# Patient Record
Sex: MALE | Race: WHITE | NOT HISPANIC OR LATINO | Employment: FULL TIME | URBAN - METROPOLITAN AREA
[De-identification: names, ages, dates, MRNs, and addresses within clinical notes are randomized per-mention and may not be internally consistent; named-entity substitution may affect disease eponyms.]

---

## 2019-03-08 ENCOUNTER — TRANSCRIBE ORDERS (OUTPATIENT)
Dept: ADMINISTRATIVE | Facility: HOSPITAL | Age: 49
End: 2019-03-08

## 2019-03-08 DIAGNOSIS — M77.12 LATERAL EPICONDYLITIS OF LEFT ELBOW: Primary | ICD-10-CM

## 2019-03-13 ENCOUNTER — HOSPITAL ENCOUNTER (OUTPATIENT)
Dept: RADIOLOGY | Facility: IMAGING CENTER | Age: 49
Discharge: HOME | End: 2019-03-13
Payer: COMMERCIAL

## 2019-03-13 DIAGNOSIS — M77.12 LATERAL EPICONDYLITIS OF LEFT ELBOW: ICD-10-CM

## 2019-03-13 PROCEDURE — 73221 MRI JOINT UPR EXTREM W/O DYE: CPT

## 2020-10-29 ENCOUNTER — OFFICE VISIT (OUTPATIENT)
Dept: PULMONOLOGY | Facility: CLINIC | Age: 50
End: 2020-10-29
Payer: COMMERCIAL

## 2020-10-29 VITALS
HEIGHT: 70 IN | HEART RATE: 88 BPM | SYSTOLIC BLOOD PRESSURE: 120 MMHG | DIASTOLIC BLOOD PRESSURE: 72 MMHG | WEIGHT: 184.13 LBS | OXYGEN SATURATION: 97 % | BODY MASS INDEX: 26.36 KG/M2 | TEMPERATURE: 96.9 F

## 2020-10-29 DIAGNOSIS — G47.33 OBSTRUCTIVE SLEEP APNEA: Primary | ICD-10-CM

## 2020-10-29 PROBLEM — F32.A DEPRESSION: Status: ACTIVE | Noted: 2020-10-29

## 2020-10-29 PROCEDURE — 99213 OFFICE O/P EST LOW 20 MIN: CPT | Performed by: INTERNAL MEDICINE

## 2020-10-29 RX ORDER — BUPROPION HYDROCHLORIDE 100 MG/1
TABLET, EXTENDED RELEASE ORAL
COMMUNITY
Start: 2020-08-17

## 2020-10-29 RX ORDER — ESCITALOPRAM OXALATE 10 MG/1
15 TABLET ORAL DAILY
COMMUNITY
Start: 2020-10-14 | End: 2021-01-14

## 2020-11-11 ENCOUNTER — TELEPHONE (OUTPATIENT)
Dept: PULMONOLOGY | Facility: CLINIC | Age: 50
End: 2020-11-11

## 2020-11-11 DIAGNOSIS — G47.33 OBSTRUCTIVE SLEEP APNEA: Primary | ICD-10-CM

## 2020-11-11 RX ORDER — ZOLPIDEM TARTRATE 5 MG/1
TABLET ORAL
Qty: 60 TABLET | Refills: 0 | Status: SHIPPED | OUTPATIENT
Start: 2020-11-11 | End: 2020-12-03 | Stop reason: DRUGHIGH

## 2020-12-03 ENCOUNTER — OFFICE VISIT (OUTPATIENT)
Dept: PULMONOLOGY | Facility: CLINIC | Age: 50
End: 2020-12-03
Payer: COMMERCIAL

## 2020-12-03 VITALS
BODY MASS INDEX: 25.95 KG/M2 | TEMPERATURE: 97.6 F | WEIGHT: 181.25 LBS | HEART RATE: 89 BPM | SYSTOLIC BLOOD PRESSURE: 120 MMHG | DIASTOLIC BLOOD PRESSURE: 78 MMHG | OXYGEN SATURATION: 96 % | HEIGHT: 70 IN

## 2020-12-03 DIAGNOSIS — G47.33 OBSTRUCTIVE SLEEP APNEA: Primary | ICD-10-CM

## 2020-12-03 PROCEDURE — 99213 OFFICE O/P EST LOW 20 MIN: CPT | Performed by: INTERNAL MEDICINE

## 2020-12-03 RX ORDER — FLUTICASONE PROPIONATE 50 MCG
2 SPRAY, SUSPENSION (ML) NASAL DAILY
Qty: 1 BOTTLE | Refills: 5 | Status: SHIPPED | OUTPATIENT
Start: 2020-12-03 | End: 2021-05-24

## 2020-12-03 RX ORDER — ZOLPIDEM TARTRATE 12.5 MG/1
12.5 TABLET, FILM COATED, EXTENDED RELEASE ORAL
COMMUNITY
Start: 2020-11-25 | End: 2020-12-03 | Stop reason: ALTCHOICE

## 2020-12-03 RX ORDER — ZALEPLON 10 MG/1
10 CAPSULE ORAL
Qty: 30 CAPSULE | Refills: 1 | Status: SHIPPED | OUTPATIENT
Start: 2020-12-03 | End: 2021-01-14 | Stop reason: SDUPTHER

## 2021-01-14 ENCOUNTER — OFFICE VISIT (OUTPATIENT)
Dept: PULMONOLOGY | Facility: CLINIC | Age: 51
End: 2021-01-14
Payer: COMMERCIAL

## 2021-01-14 VITALS
BODY MASS INDEX: 26.68 KG/M2 | OXYGEN SATURATION: 98 % | HEART RATE: 87 BPM | SYSTOLIC BLOOD PRESSURE: 114 MMHG | DIASTOLIC BLOOD PRESSURE: 78 MMHG | WEIGHT: 186.38 LBS | HEIGHT: 70 IN | TEMPERATURE: 97.6 F

## 2021-01-14 DIAGNOSIS — G47.33 OBSTRUCTIVE SLEEP APNEA: ICD-10-CM

## 2021-01-14 PROCEDURE — 99212 OFFICE O/P EST SF 10 MIN: CPT | Performed by: INTERNAL MEDICINE

## 2021-01-14 RX ORDER — ESCITALOPRAM OXALATE 20 MG/1
20 TABLET ORAL DAILY
COMMUNITY
Start: 2020-12-09

## 2021-01-14 RX ORDER — ZOLPIDEM TARTRATE 10 MG/1
10 TABLET ORAL
COMMUNITY
Start: 2021-01-06 | End: 2021-01-14 | Stop reason: ALTCHOICE

## 2021-01-14 RX ORDER — ZALEPLON 10 MG/1
10 CAPSULE ORAL
Qty: 30 CAPSULE | Refills: 1 | Status: SHIPPED | OUTPATIENT
Start: 2021-01-14

## 2021-01-14 NOTE — ASSESSMENT & PLAN NOTE
This patient still struggles to use effective CPAP because of nasal obstruction  He has nasal surgery scheduled on March 1st   He does sense benefit when he uses the CPAP but he cannot use it all night because of nasal obstruction  Plan to meet again after he has a nasal surgery and is able to try using CPAP again    Renew his zaleplon which she uses to help fall asleep with the CPAP on   10 minutes visit with counseling

## 2021-01-14 NOTE — PROGRESS NOTES
Assessment/Plan:    Obstructive sleep apnea  This patient still struggles to use effective CPAP because of nasal obstruction  He has nasal surgery scheduled on March 1st   He does sense benefit when he uses the CPAP but he cannot use it all night because of nasal obstruction  Plan to meet again after he has a nasal surgery and is able to try using CPAP again  Renew his zaleplon which she uses to help fall asleep with the CPAP on   10 minutes visit with counseling       Diagnoses and all orders for this visit:    Obstructive sleep apnea  -     zaleplon (SONATA) 10 MG capsule; Take 1 capsule (10 mg total) by mouth daily at bedtime    Other orders  -     Discontinue: zolpidem (AMBIEN) 10 mg tablet; Take 10 mg by mouth daily at bedtime  -     escitalopram (LEXAPRO) 20 mg tablet; Take 20 mg by mouth daily          Subjective:      Patient ID: Yanni Bower is a 45 y o  male  This patient still struggles to use CPAP  Problem is that he awakens in the middle of the night with the CPAP device off because his nose is completely obstructed  He does sense some minor benefit with nose nasal function with the use of Flonase  He has met with an ear nose and throat physician and has plans for nasal surgery on March 1st   He still would like to continue with CPAP as he senses some benefit even though he only uses it for 1-1/2 hours nightly  Alternative is to use an inspire device which he qualifies for  The following portions of the patient's history were reviewed and updated as appropriate: allergies, current medications, past family history, past medical history, past social history, past surgical history and problem list     Review of Systems   Constitutional: Negative for activity change  HENT: Positive for congestion  Respiratory: Positive for apnea  Neurological: Positive for headaches           Objective:      /78 (BP Location: Left arm, Patient Position: Sitting, Cuff Size: Adult)   Pulse 87 Temp 97 6 °F (36 4 °C) (Tympanic)   Ht 5' 10" (1 778 m)   Wt 84 5 kg (186 lb 6 oz)   SpO2 98%   BMI 26 74 kg/m²          Physical Exam  Vitals signs reviewed  Constitutional:       Appearance: He is normal weight  He is not ill-appearing  Neurological:      Mental Status: He is alert and oriented to person, place, and time     Psychiatric:         Mood and Affect: Mood normal          Behavior: Behavior normal

## 2021-03-10 DIAGNOSIS — Z23 ENCOUNTER FOR IMMUNIZATION: ICD-10-CM

## 2021-05-23 DIAGNOSIS — G47.33 OBSTRUCTIVE SLEEP APNEA: ICD-10-CM

## 2021-05-24 RX ORDER — FLUTICASONE PROPIONATE 50 MCG
SPRAY, SUSPENSION (ML) NASAL
Qty: 48 ML | Refills: 3 | Status: SHIPPED | OUTPATIENT
Start: 2021-05-24

## 2021-08-04 ENCOUNTER — TELEPHONE (OUTPATIENT)
Dept: PULMONOLOGY | Facility: CLINIC | Age: 51
End: 2021-08-04

## 2021-08-04 NOTE — TELEPHONE ENCOUNTER
8/4 Pt said he received an email from company about CPAP recall  Pt said it said in the email to stop using the CPAP machine, which he did  Pt also registered his machine online with the company  Pt said since he doesn't want to continue using the CPAP due to the cancerous causing particles, he wants to know the benefit of coming in for a visit b/c he doesn't want to pay a $50 copay just to discuss this issue  Please review and advise pt why Dr Madeline Bryan would need to see him before the pt receives his new machine and has had time to go back to using the CPAP

## 2021-08-09 ENCOUNTER — TELEPHONE (OUTPATIENT)
Dept: PULMONOLOGY | Facility: CLINIC | Age: 51
End: 2021-08-09

## 2021-08-09 NOTE — TELEPHONE ENCOUNTER
Senses some improvement in daytime sleepiness with nose surgery and antidepressant  Not using cpap in part due to nose soreness and in part due to recall  Plan not to start unless there is resolution to recall and will need new interface

## 2021-11-05 ENCOUNTER — HOSPITAL ENCOUNTER (OUTPATIENT)
Dept: CT IMAGING | Facility: HOSPITAL | Age: 51
Discharge: HOME | End: 2021-11-05
Payer: COMMERCIAL

## 2021-11-05 DIAGNOSIS — J34.89 NASAL OBSTRUCTION: ICD-10-CM

## 2021-11-05 DIAGNOSIS — G47.33 OBSTRUCTIVE SLEEP APNEA: ICD-10-CM

## 2021-11-05 DIAGNOSIS — R51.9 CHRONIC DAILY HEADACHE: ICD-10-CM

## 2021-11-05 PROCEDURE — 70486 CT MAXILLOFACIAL W/O DYE: CPT

## 2021-11-05 PROCEDURE — G1004 CDSM NDSC: HCPCS

## 2021-11-12 ENCOUNTER — TELEPHONE (OUTPATIENT)
Dept: GASTROENTEROLOGY | Facility: CLINIC | Age: 51
End: 2021-11-12

## 2022-01-14 ENCOUNTER — ANESTHESIA EVENT (OUTPATIENT)
Dept: GASTROENTEROLOGY | Facility: AMBULATORY SURGERY CENTER | Age: 52
End: 2022-01-14

## 2022-01-14 ENCOUNTER — HOSPITAL ENCOUNTER (OUTPATIENT)
Dept: GASTROENTEROLOGY | Facility: AMBULATORY SURGERY CENTER | Age: 52
Discharge: HOME | End: 2022-01-14
Payer: COMMERCIAL

## 2022-01-14 ENCOUNTER — ANESTHESIA (OUTPATIENT)
Dept: GASTROENTEROLOGY | Facility: AMBULATORY SURGERY CENTER | Age: 52
End: 2022-01-14

## 2022-01-14 VITALS
SYSTOLIC BLOOD PRESSURE: 125 MMHG | TEMPERATURE: 96.6 F | RESPIRATION RATE: 18 BRPM | WEIGHT: 171 LBS | HEIGHT: 70 IN | BODY MASS INDEX: 24.48 KG/M2 | OXYGEN SATURATION: 98 % | HEART RATE: 55 BPM | DIASTOLIC BLOOD PRESSURE: 83 MMHG

## 2022-01-14 DIAGNOSIS — K21.00 GASTROESOPHAGEAL REFLUX DISEASE WITH ESOPHAGITIS, UNSPECIFIED WHETHER HEMORRHAGE: ICD-10-CM

## 2022-01-14 DIAGNOSIS — Z86.010 HISTORY OF COLON POLYPS: ICD-10-CM

## 2022-01-14 PROCEDURE — 88305 TISSUE EXAM BY PATHOLOGIST: CPT | Performed by: PATHOLOGY

## 2022-01-14 PROCEDURE — 43239 EGD BIOPSY SINGLE/MULTIPLE: CPT | Performed by: INTERNAL MEDICINE

## 2022-01-14 PROCEDURE — 45380 COLONOSCOPY AND BIOPSY: CPT | Performed by: INTERNAL MEDICINE

## 2022-01-14 PROCEDURE — 00813 ANES UPR LWR GI NDSC PX: CPT | Performed by: NURSE ANESTHETIST, CERTIFIED REGISTERED

## 2022-01-14 RX ORDER — SODIUM CHLORIDE 9 MG/ML
INJECTION, SOLUTION INTRAVENOUS CONTINUOUS PRN
Status: DISCONTINUED | OUTPATIENT
Start: 2022-01-14 | End: 2022-01-14

## 2022-01-14 RX ORDER — PROPOFOL 10 MG/ML
INJECTION, EMULSION INTRAVENOUS AS NEEDED
Status: DISCONTINUED | OUTPATIENT
Start: 2022-01-14 | End: 2022-01-14

## 2022-01-14 RX ADMIN — PROPOFOL 100 MG: 10 INJECTION, EMULSION INTRAVENOUS at 08:11

## 2022-01-14 RX ADMIN — PROPOFOL 100 MG: 10 INJECTION, EMULSION INTRAVENOUS at 08:06

## 2022-01-14 RX ADMIN — PROPOFOL 100 MG: 10 INJECTION, EMULSION INTRAVENOUS at 08:09

## 2022-01-14 RX ADMIN — PROPOFOL 100 MG: 10 INJECTION, EMULSION INTRAVENOUS at 08:07

## 2022-01-14 RX ADMIN — PROPOFOL 100 MG: 10 INJECTION, EMULSION INTRAVENOUS at 08:15

## 2022-01-14 RX ADMIN — PROPOFOL 150 MG: 10 INJECTION, EMULSION INTRAVENOUS at 08:05

## 2022-01-14 RX ADMIN — SODIUM CHLORIDE: 9 INJECTION, SOLUTION INTRAVENOUS at 08:02

## 2022-01-14 RX ADMIN — PROPOFOL 50 MG: 10 INJECTION, EMULSION INTRAVENOUS at 08:12

## 2022-01-14 NOTE — ANESTHESIA POSTPROCEDURE EVALUATION
Post-Op Assessment Note    CV Status:  Stable  Pain Score: 0    Pain management: adequate     Mental Status:  Sleepy   Hydration Status:  Stable   PONV Controlled:  Controlled   Airway Patency:  Patent      Post Op Vitals Reviewed: Yes      Staff: Anesthesiologist, CRNA         No complications documented      BP      Temp     Pulse     Resp      SpO2

## 2022-01-14 NOTE — H&P
History and Physical - [facility]  Chong Bennett 45 y o  male MRN: [id number]        GERD and polyps          HPI: Chong Bennett is a 45y o  year old male who presents for GERD and polyps      REVIEW OF SYSTEMS: Per the HPI, and otherwise unremarkable  Historical Information   Past Medical History:   Diagnosis Date    Allergic rhinitis     Anxiety     Arthritis     Colon polyp     CPAP (continuous positive airway pressure) dependence     CURRENTLY NOT USING DUE TO RECALL    Depression     Seasonal allergies     Sleep apnea      Past Surgical History:   Procedure Laterality Date    APPENDECTOMY      COLONOSCOPY      EGD      HERNIA REPAIR       & 2016    WISDOM TOOTH EXTRACTION       Social History   Social History     Substance and Sexual Activity   Alcohol Use Not Currently     Social History     Substance and Sexual Activity   Drug Use Never     Social History     Tobacco Use   Smoking Status Former Smoker    Types: Cigars    Quit date: 10/2019    Years since quittin 2   Smokeless Tobacco Never Used   Tobacco Comment    on occasion     History reviewed  No pertinent family history  Meds/Allergies       Current Outpatient Medications:     buPROPion (WELLBUTRIN SR) 100 mg 12 hr tablet    escitalopram (LEXAPRO) 20 mg tablet    zolpidem (AMBIEN) 10 mg tablet    fluticasone (FLONASE) 50 mcg/act nasal spray    oxyCODONE (ROXICODONE) 5 mg immediate release tablet    zaleplon (SONATA) 10 MG capsule    No Known Allergies    Objective     /78   Pulse 70   Temp (!) 96 6 °F (35 9 °C) (Temporal)   Resp 18   Ht 5' 10" (1 778 m)   Wt 77 6 kg (171 lb)   SpO2 98%   BMI 24 54 kg/m²       PHYSICAL EXAM    Gen: NAD  Head: NCAT  CV: RRR  CHEST: Clear  ABD: soft, NT/ND  EXT: no edema      ASSESSMENT/PLAN:  This is a 45y o  year old male here for EGD colonoscopy, and he is stable and optimized for his procedure

## 2022-01-14 NOTE — DISCHARGE INSTRUCTIONS
Upper Endoscopy and Colonoscopy   WHAT YOU NEED TO KNOW:   An upper endoscopy is also called an upper gastrointestinal (GI) endoscopy, or an esophagogastroduodenoscopy (EGD)  It is a procedure to examine the inside of your esophagus, stomach, and duodenum (first part of the small intestine) with a scope  You may feel bloated, gassy, or have some abdominal discomfort after your procedure  Your throat may be sore for 24 to 36 hours  You may burp or pass gas from air that is still inside your body  A colonoscopy is a procedure to examine the inside of your colon (intestine) with a scope  Polyps or tissue growths may have been removed during your colonoscopy  It is normal to feel bloated and to have some abdominal discomfort  You should be passing gas  If you have hemorrhoids or you had polyps removed, you may have a small amount of bleeding  DISCHARGE INSTRUCTIONS:   Seek care immediately if:   · You have sudden, severe abdominal pain  · You have problems swallowing  · You have a large amount of black, sticky bowel movements or blood in your bowel movements  · You have sudden trouble breathing  · You feel weak, lightheaded, or faint or your heart beats faster than normal for you  Contact your healthcare provider if:   · You have a fever and chills  · You have nausea or are vomiting  · Your abdomen is bloated or feels full and hard  · You have abdominal pain  · You have a large amount of black, sticky bowel movements or blood in your bowel movements  · You have not had a bowel movement for 3 days after your procedure  · You have rash or hives  · You have questions or concerns about your procedure  Activity:   ·       Do not lift, strain, or run for 24 hours after your procedure  ·       Rest after your procedure  You have been given medicine to relax you  Do not drive or make important decisions until the day after your procedure   Return to your normal activity as directed  ·       Relieve gas and discomfort from bloating by lying on your right side with a heating pad on your abdomen  You may need to take short walks to help the gas move out  Eat small meals until bloating is relieved  Follow up with your healthcare provider as directed: Write down your questions so you remember to ask them during your visits  If you take a blood thinner, please review the specific instructions from your endoscopist about when you should resume it  These can be found in the Recommendation and Your Medication list sections of this After Visit Summary  High Fiber Diet   WHAT YOU NEED TO KNOW:   What is a high-fiber diet? A high-fiber diet includes foods that have a high amount of fiber  Fiber is the part of fruits, vegetables, and grains that is not broken down by your body  Fiber keeps your bowel movements regular  Fiber can also help lower your cholesterol level, control blood sugar in people with diabetes, and relieve constipation  Fiber can also help you control your weight because it helps you feel full faster  Most adults should eat 25 to 35 grams of fiber each day  Talk to your dietitian or healthcare provider about the amount of fiber you need  What foods are good sources of fiber? · Foods with at least 4 grams of fiber per serving:      ? ? to ½ cup of high-fiber cereal (check the nutrition label on the box)    ? ½ cup of blackberries or raspberries    ? 4 dried prunes    ? 1 cooked artichoke    ? ½ cup of cooked legumes, such as lentils, or red, kidney, and palomares beans    · Foods with 1 to 3 grams of fiber per serving:      ? 1 slice of whole-wheat, pumpernickel, or rye bread    ? ½ cup of cooked brown rice    ? 4 whole-wheat crackers    ? 1 cup of oatmeal    ? ½ cup of cereal with 1 to 3 grams of fiber per serving (check the nutrition label on the box)    ? 1 small piece of fruit, such as an apple, banana, pear, kiwi, or orange    ?  3 dates    ? ½ cup of canned apricots, fruit cocktail, peaches, or pears    ? ½ cup of raw or cooked vegetables, such as carrots, cauliflower, cabbage, spinach, squash, or corn    What are some ways that I can increase fiber in my diet? · Choose brown or wild rice instead of white rice  · Use whole wheat flour in recipes instead of white or all-purpose flour  · Add beans and peas to casseroles or soups  · Choose fresh fruit and vegetables with peels or skins on instead of juices  What other guidelines should I follow? · Add fiber to your diet slowly  You may have abdominal discomfort, bloating, and gas if you add fiber to your diet too quickly  · Drink plenty of liquids as you add fiber to your diet  You may have nausea or develop constipation if you do not drink enough water  Ask how much liquid to drink each day and which liquids are best for you  CARE AGREEMENT:   You have the right to help plan your care  Discuss treatment options with your healthcare provider to decide what care you want to receive  You always have the right to refuse treatment  The above information is an  only  It is not intended as medical advice for individual conditions or treatments  Talk to your doctor, nurse or pharmacist before following any medical regimen to see if it is safe and effective for you  © Copyright [employer] 2021 Information is for End User's use only and may not be sold, redistributed or otherwise used for commercial purposes  All illustrations and images included in CareNotes® are the copyrighted property of A D A M [employer] Inc  or Erma Lundberg   Diverticulosis   WHAT YOU NEED TO KNOW:   What is diverticulosis? Diverticulosis is a condition that causes small pockets called diverticula to form in your intestine  These pockets make it difficult for bowel movements to pass through your digestive system  What causes diverticulosis?   Diverticula form when muscles have to work hard to move bowel movements through the intestine  The force causes bulges to form at weak areas in the intestine  This may happen if you eat foods that are low in fiber  Fiber helps give your bowel movements more bulk so they are larger and easier to move through your colon  The following may increase your risk of diverticulosis:  · A history of constipation    · Age 36 or older    · Obesity    · Lack of exercise    What are the signs and symptoms of diverticulosis? Diverticulosis usually does not cause any signs or symptoms  It may cause any of the following in some people:  · Pain or discomfort in your lower abdomen    · Abdominal bloating    · Constipation or diarrhea    How is diverticulosis diagnosed? Your healthcare provider will examine you and ask about your bowel movements, diet, and symptoms  He or she will also ask about any medical conditions you have or medicines you take  You may need any of the following:  · Blood tests  may be done to check for signs of inflammation  · A barium enema  is an x-ray of your colon that may show diverticula  A tube is put into your anus, and a liquid called barium is put through the tube  Barium is used so that healthcare providers can see your colon more clearly  · Flexible sigmoidoscopy  is a test to look for any changes in your lower intestines and rectum  It may also show the cause of any bleeding or pain  A soft, bendable tube with a light on the end will be put into your anus  It will then be moved forward into your intestine  · A colonoscopy  is used to look at your whole colon  A scope (long bendable tube with a light on the end) is used to take pictures  This test may show diverticula  · A CT scan , or CAT scan, may show diverticula  You may be given contrast liquid before the scan  Tell the healthcare provider if you have ever had an allergic reaction to contrast liquid  How is diverticulosis managed?   The goal of treatment is to manage any symptoms you have and prevent other problems such as diverticulitis  Diverticulitis is swelling or infection of the diverticula  Your healthcare provider may recommend any of the following:  · Eat a variety of high-fiber foods  High-fiber foods help you have regular bowel movements  High-fiber foods include cooked beans, fruits, vegetables, and some cereals  Most adults need 25 to 35 grams of fiber each day  Your healthcare provider may recommend that you have more  Ask your healthcare provider how much fiber you need  Increase fiber slowly  You may have abdominal discomfort, bloating, and gas if you add fiber to your diet too quickly  You may need to take a fiber supplement if you are not getting enough fiber from food  · Medicines  to soften your bowel movements may be given  You may also need medicines to treat symptoms such as bloating and pain  · Drink liquids as directed  You may need to drink 2 to 3 liters (8 to 12 cups) of liquids every day  Ask your healthcare provider how much liquid to drink each day and which liquids are best for you  · Apply heat  on your abdomen for 20 to 30 minutes every 2 hours for as many days as directed  Heat helps decrease pain and muscle spasms  How can I help prevent diverticulitis or other symptoms? The following may help decrease your risk for diverticulitis or symptoms, such as bleeding  Talk to your provider about these or other things you can do to prevent problems that may occur with diverticulosis  · Exercise regularly  Ask your healthcare provider about the best exercise plan for you  Exercise can help you have regular bowel movements  Get 30 minutes of exercise on most days of the week  · Maintain a healthy weight  Ask your healthcare provider what a healthy weight is for you  Ask him or her to help you create a weight loss plan if you are overweight  · Do not smoke    Nicotine and other chemicals in cigarettes increase your risk for diverticulitis  Ask your healthcare provider for information if you currently smoke and need help to quit  E-cigarettes or smokeless tobacco still contain nicotine  Talk to your healthcare provider before you use these products  · Ask your healthcare provider if it is safe to take NSAIDs  NSAIDs may increase your risk of diverticulitis  When should I seek immediate care? · You have severe pain on the left side of your lower abdomen  · Your bowel movements are bright or dark red  When should I call my doctor? · You have a fever and chills  · You feel dizzy or lightheaded  · You have nausea, or you are vomiting  · You have a change in your bowel movements  · You have questions or concerns about your condition or care  CARE AGREEMENT:   You have the right to help plan your care  Learn about your health condition and how it may be treated  Discuss treatment options with your healthcare providers to decide what care you want to receive  You always have the right to refuse treatment  The above information is an  only  It is not intended as medical advice for individual conditions or treatments  Talk to your doctor, nurse or pharmacist before following any medical regimen to see if it is safe and effective for you  © Copyright [employer] 2021 Information is for End User's use only and may not be sold, redistributed or otherwise used for commercial purposes  All illustrations and images included in CareNotes® are the copyrighted property of [employer]  or [address] [place] [address]  GERD (Gastroesophageal Reflux Disease)   WHAT YOU NEED TO KNOW:   Gastroesophageal reflux disease (GERD) is reflux that occurs more than twice a week for a few weeks  Reflux means acid and food in the stomach back up into the esophagus  It usually causes heartburn and other symptoms  GERD can cause other health problems over time if it is not treated         DISCHARGE INSTRUCTIONS:   Call your local emergency number (911 in the [address]) if:   · You have severe chest pain and sudden trouble breathing  Return to the emergency department if:   · You have trouble breathing after you vomit  · You have trouble swallowing, or pain with swallowing  · Your bowel movements are black, bloody, or tarry-looking  · Your vomit looks like coffee grounds or has blood in it  Call your doctor or gastroenterologist if:   · You feel full and cannot burp or vomit  · You vomit large amounts, or you vomit often  · You are losing weight without trying  · Your symptoms get worse or do not improve with treatment  · You have questions or concerns about your condition or care  Medicines:   · Medicines  are used to decrease stomach acid  Medicine may also be used to help your lower esophageal sphincter and stomach contract (tighten) more  · Take your medicine as directed  Contact your healthcare provider if you think your medicine is not helping or if you have side effects  Tell him of her if you are allergic to any medicine  Keep a list of the medicines, vitamins, and herbs you take  Include the amounts, and when and why you take them  Bring the list or the pill bottles to follow-up visits  Carry your medicine list with you in case of an emergency  Manage GERD:       · Do not have foods or drinks that may increase heartburn  These include chocolate, peppermint, fried or fatty foods, drinks that contain caffeine, or carbonated drinks (soda)  Other foods include spicy foods, onions, tomatoes, and tomato-based foods  Do not have foods or drinks that can irritate your esophagus, such as citrus fruits, juices, and alcohol  · Do not eat large meals  When you eat a lot of food at one time, your stomach needs more acid to digest it  Eat 6 small meals each day instead of 3 large ones, and eat slowly  Do not eat meals 2 to 3 hours before bedtime  · Elevate the head of your bed    Place 6-inch blocks under the head of your bed frame  You may also use more than one pillow under your head and shoulders while you sleep  · Maintain a healthy weight  If you are overweight, weight loss may help relieve symptoms of GERD  · Do not smoke  Smoking weakens the lower esophageal sphincter and increases the risk of GERD  Ask your healthcare provider for information if you currently smoke and need help to quit  E-cigarettes or smokeless tobacco still contain nicotine  Talk to your healthcare provider before you use these products  · Do not wear clothing that is tight around your waist   Tight clothing can put pressure on your stomach and cause or worsen GERD symptoms  Follow up with your doctor or gastroenterologist as directed:  Write down your questions so you remember to ask them during your visits  © Copyright [employer] 2021 Information is for End User's use only and may not be sold, redistributed or otherwise used for commercial purposes  All illustrations and images included in CareNotes® are the copyrighted property of A D A M , Inc  or [email or web address]   The above information is an  only  It is not intended as medical advice for individual conditions or treatments  Talk to your doctor, nurse or pharmacist before following any medical regimen to see if it is safe and effective for you  Gastritis   WHAT YOU NEED TO KNOW:   What is gastritis? Gastritis is inflammation or irritation of the lining of your stomach  What increases my risk for gastritis? · Infection with bacteria, a virus, or a parasite    · NSAIDs, aspirin, or steroid medicine    · Use of tobacco products or alcohol    · Trauma such as an injury to your stomach or intestine    · Autoimmune disorders such as diabetes, thyroid disease, or Crohn disease    · Stress    · Age older than 60 years    · Illegal drugs, such as cocaine    What are the signs and symptoms of gastritis?    · Stomach pain, burning, or tenderness when you press on it    · Stomach fullness or tightness    · Nausea or vomiting    · Loss of appetite, or feeling full quickly when you eat    · Bad breath    · Fatigue or feeling more tired than usual    · Heartburn    How is gastritis diagnosed? Your healthcare provider will ask about your signs and symptoms and examine you  You may need any of the following:  · Blood tests  may be used to show an infection, dehydration, or anemia (low red blood cell levels)  · A bowel movement sample  may be tested for blood or the germ that may be causing your gastritis  · A breath test  may show if H pylori is causing your gastritis  You will be given a liquid to drink  Then you will breathe into a bag  Your healthcare provider will measure the amount of carbon dioxide in your breath  Extra amounts of carbon dioxide may mean you have an H pylori infection  · An endoscopy  may be used to look for irritation or bleeding in your stomach  Your healthcare provider will use an endoscope (tube with a light and camera on the end) during the procedure  He or she may take a sample from your stomach to be tested  How is gastritis treated? Your symptoms may go away without treatment  Treatment will depend on what is causing your gastritis  Your healthcare provider may recommend changes to the medicines you take  Medicines may be given to help treat a bacterial infection or decrease stomach acid  How can I manage or prevent gastritis? · Do not smoke  Nicotine and other chemicals in cigarettes and cigars can make your symptoms worse and cause lung damage  Ask your healthcare provider for information if you currently smoke and need help to quit  E-cigarettes or smokeless tobacco still contain nicotine  Talk to your healthcare provider before you use these products  · Do not drink alcohol  Alcohol can prevent healing and make your gastritis worse   Talk to your healthcare provider if you need help to stop drinking  · Do not take NSAIDs or aspirin unless directed  These and similar medicines can cause irritation of your stomach lining  If your healthcare provider says it is okay to take NSAIDs, take them with food  · Do not eat foods that cause irritation  Foods such as oranges and salsa can cause burning or pain  Eat a variety of healthy foods  Examples include fruits (not citrus), vegetables, low-fat dairy products, beans, whole-grain breads, and lean meats and fish  Try to eat small meals, and drink water with your meals  Do not eat for at least 3 hours before you go to bed  · Find ways to relax and decrease stress  Stress can increase stomach acid and make gastritis worse  Activities such as yoga, meditation, or listening to music can help you relax  Spend time with friends, or do things you enjoy  Call 911 for any of the following:   · You develop chest pain or shortness of breath  When should I seek immediate care? · You vomit blood  · You have black or bloody bowel movements  · You have severe stomach or back pain  When should I contact my healthcare provider? · You have a fever  · You have new or worsening symptoms, even after treatment  · You have questions or concerns about your condition or care  CARE AGREEMENT:   You have the right to help plan your care  Learn about your health condition and how it may be treated  Discuss treatment options with your healthcare providers to decide what care you want to receive  You always have the right to refuse treatment  The above information is an  only  It is not intended as medical advice for individual conditions or treatments  Talk to your doctor, nurse or pharmacist before following any medical regimen to see if it is safe and effective for you  © Copyright [employer] 2021 Information is for End User's use only and may not be sold, redistributed or otherwise used for commercial purposes   All illustrations and images included in CareNotes® are the copyrighted property of A D A M , Inc  or Erma Enriquez

## 2022-01-14 NOTE — ANESTHESIA PREPROCEDURE EVALUATION
Procedure:  COLONOSCOPY  EGD    Relevant Problems   NEURO/PSYCH   (+) Depression      PULMONARY   (+) Obstructive sleep apnea        Physical Exam    Airway    Mallampati score: II  TM Distance: >3 FB  Neck ROM: full     Dental       Cardiovascular  Rhythm: regular, Rate: normal,     Pulmonary      Other Findings        Anesthesia Plan  ASA Score- 2     Anesthesia Type- IV sedation with anesthesia with ASA Monitors  Additional Monitors:   Airway Plan:           Plan Factors-Exercise tolerance (METS): >4 METS  Chart reviewed  Induction-     Postoperative Plan-     Informed Consent- Anesthetic plan and risks discussed with patient  I personally reviewed this patient with the CRNA  Discussed and agreed on the Anesthesia Plan with the CRNA  Kenneth Katz

## 2022-01-21 NOTE — RESULT ENCOUNTER NOTE
Please call the patient regarding his abnormal result  Mild redness mass erythema in terminal ileum, follow-up in our office if any diarrhea and or abdominal pain symptoms  Otherwise follow-up colonoscopy in 3 years  Please change the recall    Follow up in my office as needed

## 2022-04-13 ENCOUNTER — OFFICE VISIT (OUTPATIENT)
Dept: SLEEP CENTER | Facility: CLINIC | Age: 52
End: 2022-04-13
Payer: COMMERCIAL

## 2022-04-13 VITALS
BODY MASS INDEX: 26.74 KG/M2 | HEIGHT: 70 IN | SYSTOLIC BLOOD PRESSURE: 128 MMHG | WEIGHT: 186.8 LBS | OXYGEN SATURATION: 96 % | DIASTOLIC BLOOD PRESSURE: 90 MMHG | HEART RATE: 94 BPM

## 2022-04-13 DIAGNOSIS — D50.8 OTHER IRON DEFICIENCY ANEMIA: Primary | ICD-10-CM

## 2022-04-13 DIAGNOSIS — G47.30 SLEEP APNEA: ICD-10-CM

## 2022-04-13 DIAGNOSIS — G47.33 OSA (OBSTRUCTIVE SLEEP APNEA): ICD-10-CM

## 2022-04-13 PROCEDURE — 99203 OFFICE O/P NEW LOW 30 MIN: CPT | Performed by: INTERNAL MEDICINE

## 2022-04-13 NOTE — PROGRESS NOTES
Consultation - [facility] : 1970  MRN: [id number]      Assessment:  The patient has moderate obstructive sleep apnea with AHI = 19 8  He was started on positive airway pressure therapy, which he had difficulty tolerating due to claustrophobia  He also stopped using his device when he was notified of the recall  His depression and feeling sluggish could be result of untreated JUSTIN  Plan:  The patient's primary complaint is claustrophobia  I will start him on a P 10 interface, which I hope will be more comfortable  I also suggested that he restart using his prior machine as long as there are no specks of foam in the equipment  Check serum ferritin to see if iron is needed to treat his RLS  Follow up:  Six months    History of Present Illness:   45 y o male with a previous history of obstructive sleep apnea, with AHI = 19 8, who has not been using his device because of concerns about the machine recall  In addition, he was having difficulty finding a mask that he could tolerate  Most masks made him claustrophobic  He has a history of excessive daytime sleepiness, which seemed to improve with use of his device  He also reports occasional restless legs  He denies symptoms of periodic limb movements          Review of Systems      Genitourinary none   Cardiology none   Gastrointestinal none   Neurology awaken with headache   Constitutional none   Integumentary none   Psychiatry anxiety, depression, aggressiveness or irritability and mood change   Musculoskeletal none   Pulmonary none   ENT none   Endocrine none   Hematological none           I have reviewed and updated the review of systems as necessary    Historical Information    Past Medical History:  Depression, anxiety    Family History: non-contributory    Social History     Socioeconomic History    Marital status:      Spouse name: Not on file    Number of children: Not on file    Years of education: Not on file    Highest education level: Not on file   Occupational History    Not on file   Tobacco Use    Smoking status: Former Smoker     Types: Cigars     Quit date: 10/2019     Years since quittin 5    Smokeless tobacco: Never Used    Tobacco comment: on occasion   Vaping Use    Vaping Use: Former   Substance and Sexual Activity    Alcohol use: Not Currently    Drug use: Never    Sexual activity: Not on file   Other Topics Concern    Not on file   Social History Narrative    · Most recent tobacco use screenin2019      · Alcohol intake:   None      · Asbestos exposure:   No      · TB exposure:   No      · Environmental exposure:    Yes      · Animal exposure:   Yes      Social Determinants of Health     Financial Resource Strain: Not on file   Food Insecurity: Not on file   Transportation Needs: Not on file   Physical Activity: Not on file   Stress: Not on file   Social Connections: Not on file   Intimate Partner Violence: Not on file   Housing Stability: Not on file         Sleep Schedule: unremarkable    Snoring:  Yes    Witnessed Apnea:  No    Medications/Allergies:    Current Outpatient Medications:     buPROPion (WELLBUTRIN SR) 100 mg 12 hr tablet, TAKE 1 TAB IN AM WITH FOOD, Disp: , Rfl:     escitalopram (LEXAPRO) 20 mg tablet, Take 20 mg by mouth daily, Disp: , Rfl:     fluticasone (FLONASE) 50 mcg/act nasal spray, SPRAY 2 SPRAYS INTO EACH NOSTRIL EVERY DAY, Disp: 48 mL, Rfl: 3    oxyCODONE (ROXICODONE) 5 mg immediate release tablet, Take 1 tablet (5 mg total) by mouth every 4 (four) hours as needed for moderate pain or severe painMax Daily Amount: 30 mg, Disp: 10 tablet, Rfl: 0    zaleplon (SONATA) 10 MG capsule, Take 1 capsule (10 mg total) by mouth daily at bedtime, Disp: 30 capsule, Rfl: 1    zolpidem (AMBIEN) 10 mg tablet, , Disp: , Rfl:         No notes on file                  Objective:    Vital Signs:   Vitals:    22 1232   BP: 128/90   Pulse: 94   SpO2: 96% Weight: 84 7 kg (186 lb 12 8 oz)   Height: 5' 10" (1 778 m)     Neck Circumference: 16      Reading Sleepiness Scale: Total score: 8    Physical Exam:    General: Alert, appropriate, cooperative, normal build    Head: NC/AT, mild retrognathia    Nose: No septal deviation, nares not obstructed, mucosa normal    Throat: Airway slightly diminished, tongue base slightly thickened, no tonsils visualized    Neck: Normal, no thyromegaly or lymphadenopathy, no JVD    Heart: RR, normal S1 and S2, no murmurs    Chest: Clear bilaterally    Extremity: No clubbing, cyanosis, no edema    Skin: Warm, dry    Neuro: No motor abnormalities, cranial nerves appear intact    Sleep Study Results:   AHI = 19 8  PAP Pressure: Not available  DME Provider: [employer] [facility]    Counseling / Coordination of Care  A description of the counseling / coordination of care: We discussed the pathophysiology of obstructive sleep apnea as well as the possible treatment options  We also discussed the rationale for positive airway pressure therapy  Board Certified Sleep Specialist    Portions of the record may have been created with voice recognition software  Occasional wrong word or "sound a like" substitutions may have occurred due to the inherent limitations of voice recognition software  Read the chart carefully and recognize, using context, where substitutions have occurred

## 2022-04-14 ENCOUNTER — TELEPHONE (OUTPATIENT)
Dept: SLEEP CENTER | Facility: CLINIC | Age: 52
End: 2022-04-14

## 2022-04-14 NOTE — TELEPHONE ENCOUNTER
Rx for PAP resupply sent to [facility] via [facility]

## 2022-07-05 ENCOUNTER — TELEPHONE (OUTPATIENT)
Dept: SLEEP CENTER | Facility: CLINIC | Age: 52
End: 2022-07-05

## 2022-08-10 ENCOUNTER — TELEPHONE (OUTPATIENT)
Dept: PULMONOLOGY | Facility: CLINIC | Age: 52
End: 2022-08-10

## 2022-08-10 NOTE — TELEPHONE ENCOUNTER
I called patient he is not currently using his CPAP  Patient stated that he has not received his replacement mask  Will review with Sara on Monday

## 2023-01-16 NOTE — PROGRESS NOTES
OT Evaluation     Today's date: 2023  Patient name: Eric Morgan  : 1970  MRN: [id number]  Referring provider: Nidia Artis MD  Dx:   Encounter Diagnosis     ICD-10-CM    1  Ulnar neuritis, right  G56 21                      Assessment  Assessment details: Alvaro Levine is referred to therapy s/p right subcutaneous ulnar nerve transposition performed on 23  He reports that since surgery the numbness he was experiencing has improved 50%  There continues to be mild sensory reduction in the thumb, index and ring fingers (3 61 monofilament)  He presents with mildly reduced AROM in the elbow and wrist, soft tissue tightness, edema, and reduced functional use of the right arm at this time  He will benefit from skilled occupational therapy at a frequency of 1 time per week to address these deficits and to improve functional use of the right upper extremity  See below for a detailed assessment  Impairments: abnormal or restricted ROM, activity intolerance, impaired physical strength, lacks appropriate home exercise program and pain with function  Other impairment: Swelling and bruising, numbness    Goals  STG: Patient will be compliant with post operative precautions and home exercise program in 1 week  STG: Pain will not exceed a 1/10 during appropriate activity and during therapy in 4 weeks  STG: Inflammation/edema will be reduced by 1cm in the elbow and the wrist and patient will be independent with self management techniques in 4 weeks  STG: Range of motion of right wrist and elbow will be improved to contralateral side measures in 4 weeks  STG: Subjective reports of numbness and tingling will be improved and light touch sensation improved to 2 83 monofilament in all the digits in 4 weeks  LTG: Performance in ADLs and IADLS will be improved to prior level of function with the affected extremity within 6 weeks or discharge     LTG: Performance in work activity will be improved to prior level of function with the affected extremity within 6 weeks or discharge  LTG: FOTO score increase by 20 points within 6 weeks or discharge  Plan  Plan details: Treatment to include modalities, manual therapy, PRE's, HEP, and orthotics as appropriate  Patient would benefit from: skilled OT and OT eval  Planned modality interventions: thermotherapy: hydrocollator packs  Planned therapy interventions: home exercise program, joint mobilization, manual therapy, therapeutic exercise, patient education, therapeutic activities, neuromuscular re-education, strengthening and stretching  Frequency: 1x week  Duration in visits: [address] beginning date: 2023  Plan of Care expiration date: 2023  Treatment plan discussed with: patient        Subjective Evaluation    History of Present Illness  Date of surgery: 2023  Mechanism of injury: surgery  Mechanism of injury: Zechariah Romo reports that he has been experiencing right elbow pain for years, related to arthritis and also lateral epicondylitis  He also would experience increasing frequency of his ring and small finger going numb  Pain  Current pain ratin  At worst pain rating: 3    Social Support    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[employer])  Hand dominance: right    Treatments  Current treatment: occupational therapy  Patient Goals  Patient goals for therapy: decreased pain, increased motion and increased strength          Objective     Observations     Additional Observation Details  Partially intact steristrips  Bruising proximal to elbow       Neurological Testing     Sensation     Wrist/Hand     Right   Diminished: light touch    Comments   Right light touch: 2 83 middle and small, 3 61 all others    Active Range of Motion     Left Elbow   Flexion: 145 degrees   Extension: 0 degrees     Right Elbow   Flexion: 142 degrees   Extension: 3 degrees   Forearm pronation: WFL    Left Wrist   Wrist flexion: 60 degrees   Wrist extension: 60 degrees   Radial deviation: 17 degrees   Ulnar deviation: 30 degrees     Right Wrist   Wrist flexion: 60 degrees   Wrist extension: 62 degrees   Radial deviation: 15 degrees   Ulnar deviation: 25 degrees     Additional Active Range of Motion Details  Full digital flexion  Swelling   Left Elbow Girth Measurements   Girth at joint line (cm): 27 6  Right Elbow Girth Measurements   Girth at joint line (cm): 29 6  Left Wrist/Hand   Circumference wrist: 16 6 cm    Right Wrist/Hand   Circumference wrist: 17 8 cm             Precautions: DOS 1/6/23    7 Days Postop-The post-op compressive dressing is removed  Edema management with an elastic stockinette and a soft elbow pad to protect the elbow from direct pressure  Mid-Range active ROM exercises may be initiated to the elbow and forearm, along with full ROM to the wrist  To begin with mid-range motion and gradually increase the arc of motion over a day or two is encouraged  Gentle, short-arc nerve gliding exercises may be initiated 5-10 repetitions, 2-3 times a day  Activity modification for home 10 - 14 Days Postop The suture/staples are removed  [Note: Should the wound not be fully healed, it is recommended to wait an additional 3-5 days to remove the sutures  Wound separation on the posterior elbow will delay the recovery and may require Steri-Strips, until the wound heals] Scar massage; watch for fragility of scar line  Should there be mild adherence of the skin to the underlying subcutaneous tissue, the use of the kinesio tape may prove to be beneficial  Desensitization HEP prn  Edema management  Full arc AROM exercises may be initiated to the elbow and forearm  HEP including scar massage, progressive endurance building and patient education for return to normal activity  Thermal ultrasound as needed for medial elbow scar - 3mhz,  8w/cm2, 8 mins continuous     3 Weeks Postop-AAROM and PROM may be initiated for the elbow      4 Weeks Postop- The patient may begin hand strengthening with soft putty including putty exercises for the intrinsics  Initially, the endurance building and strengthening is performed for 5 minute sessions  During subsequent weeks, the time may be extended, being sure to avoid complete muscle fatigue  6 Weeks Postop - PREs may be initiated to the elbow, forearm wrist and hand  Considerations for restrictions with strengthening include: avoid emphasis on strengthening the flexor carpi ulnaris and medial triceps  Anatomically, both occupy space surrounding the ulnar nerve  CONSIDERATIONS The patient may return to ADLs within the initial month following surgery  Work restrictions are common for 4-6 weeks with repetitive and physically demanding work tasks  In addition, gradually returning to sports at 8 weeks is recommended        Manuals 1/17            STM             MEM             Scar massage                          Neuro Re-Ed                                                                                                        Ther Ex             HEP: Partial range AVANI, FDS gliding, TG, wrist AROM, elbow AROM                                                                                                       Ther Activity                                       Gait Training                                       Modalities             [facility] 5'

## 2023-01-17 ENCOUNTER — EVALUATION (OUTPATIENT)
Dept: OCCUPATIONAL THERAPY | Facility: CLINIC | Age: 53
End: 2023-01-17

## 2023-01-17 DIAGNOSIS — G56.21 ULNAR NEURITIS, RIGHT: Primary | ICD-10-CM

## 2023-01-17 NOTE — LETTER
2023    Arlin Jimbo, [address] [address]    Patient: Leelee Sloan   YOB: 1970   Date of Visit: 2023     Encounter Diagnosis     ICD-10-CM    1  Ulnar neuritis, right  G56 21           Dear Dr Lashawn Llamas: Thank you for your recent referral of Leelee Sloan  Please review the attached evaluation summary from Jose Alberto's recent visit  Please verify that you agree with the plan of care by signing the attached order  If you have any questions or concerns, please do not hesitate to call  I sincerely appreciate the opportunity to share in the care of one of your patients and hope to have another opportunity to work with you in the near future  Sincerely,    Javon Faye, OT      Referring Provider:     I certify that I have read the below Plan of Care and certify the need for these services furnished under this plan of treatment while under my care  Arlin Choi MD  [address] [place] [address]  [facility] [facility] [place]        OT Evaluation     Today's date: 2023  Patient name: Leelee Sloan  : 1970  MRN: [id number]  Referring provider: Alejandra Ricardo MD  Dx:   Encounter Diagnosis     ICD-10-CM    1  Ulnar neuritis, right  G56 21                      Assessment  Assessment details: Mariel Devries is referred to therapy s/p right subcutaneous ulnar nerve transposition performed on 23  He reports that since surgery the numbness he was experiencing has improved 50%  There continues to be mild sensory reduction in the thumb, index and ring fingers (3 61 monofilament)  He presents with mildly reduced AROM in the elbow and wrist, soft tissue tightness, edema, and reduced functional use of the right arm at this time  He will benefit from skilled occupational therapy at a frequency of 1 time per week to address these deficits and to improve functional use of the right upper extremity  See below for a detailed assessment  Impairments: abnormal or restricted ROM, activity intolerance, impaired physical strength, lacks appropriate home exercise program and pain with function  Other impairment: Swelling and bruising, numbness    Goals  STG: Patient will be compliant with post operative precautions and home exercise program in 1 week  STG: Pain will not exceed a 1/10 during appropriate activity and during therapy in 4 weeks  STG: Inflammation/edema will be reduced by 1cm in the elbow and the wrist and patient will be independent with self management techniques in 4 weeks  STG: Range of motion of right wrist and elbow will be improved to contralateral side measures in 4 weeks  STG: Subjective reports of numbness and tingling will be improved and light touch sensation improved to 2 83 monofilament in all the digits in 4 weeks  LTG: Performance in ADLs and IADLS will be improved to prior level of function with the affected extremity within 6 weeks or discharge  LTG: Performance in work activity will be improved to prior level of function with the affected extremity within 6 weeks or discharge  LTG: FOTO score increase by 20 points within 6 weeks or discharge  Plan  Plan details: Treatment to include modalities, manual therapy, PRE's, HEP, and orthotics as appropriate     Patient would benefit from: skilled OT and OT eval  Planned modality interventions: thermotherapy: hydrocollator packs  Planned therapy interventions: home exercise program, joint mobilization, manual therapy, therapeutic exercise, patient education, therapeutic activities, neuromuscular re-education, strengthening and stretching  Frequency: 1x week  Duration in visits: [address] beginning date: 1/17/2023  Plan of Care expiration date: 2/28/2023  Treatment plan discussed with: patient        Subjective Evaluation    History of Present Illness  Date of surgery: 1/6/2023  Mechanism of injury: surgery  Mechanism of injury: Jewel Hock reports that he has been experiencing right elbow pain for years, related to arthritis and also lateral epicondylitis  He also would experience increasing frequency of his ring and small finger going numb  Pain  Current pain ratin  At worst pain rating: 3    Social Support    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working)  Hand dominance: right    Treatments  Current treatment: occupational therapy  Patient Goals  Patient goals for therapy: decreased pain, increased motion and increased strength          Objective     Observations     Additional Observation Details  Partially intact steristrips  Bruising proximal to elbow  Neurological Testing     Sensation     Wrist/Hand     Right   Diminished: light touch    Comments   Right light touch: 2 83 middle and small, 3 61 all others    Active Range of Motion     Left Elbow   Flexion: 145 degrees   Extension: 0 degrees     Right Elbow   Flexion: 142 degrees   Extension: 3 degrees   Forearm pronation: WFL    Left Wrist   Wrist flexion: 60 degrees   Wrist extension: 60 degrees   Radial deviation: 17 degrees   Ulnar deviation: 30 degrees     Right Wrist   Wrist flexion: 60 degrees   Wrist extension: 62 degrees   Radial deviation: 15 degrees   Ulnar deviation: 25 degrees     Additional Active Range of Motion Details  Full digital flexion  Swelling   Left Elbow Girth Measurements   Girth at joint line (cm): 27 6  Right Elbow Girth Measurements   Girth at joint line (cm): 29 6  Left Wrist/Hand   Circumference wrist: 16 6 cm    Right Wrist/Hand   Circumference wrist: 17 8 cm            Precautions: DOS 23    7 Days Postop-The post-op compressive dressing is removed  Edema management with an elastic stockinette and a soft elbow pad to protect the elbow from direct pressure   Mid-Range active ROM exercises may be initiated to the elbow and forearm, along with full ROM to the wrist  To begin with mid-range motion and gradually increase the arc of motion over a day or two is encouraged  Gentle, short-arc nerve gliding exercises may be initiated 5-10 repetitions, 2-3 times a day  Activity modification for home 10 - 14 Days Postop The suture/staples are removed  [Note: Should the wound not be fully healed, it is recommended to wait an additional 3-5 days to remove the sutures  Wound separation on the posterior elbow will delay the recovery and may require Steri-Strips, until the wound heals] Scar massage; watch for fragility of scar line  Should there be mild adherence of the skin to the underlying subcutaneous tissue, the use of the kinesio tape may prove to be beneficial  Desensitization HEP prn  Edema management  Full arc AROM exercises may be initiated to the elbow and forearm  HEP including scar massage, progressive endurance building and patient education for return to normal activity  Thermal ultrasound as needed for medial elbow scar - 3mhz,  8w/cm2, 8 mins continuous     3 Weeks Postop-AAROM and PROM may be initiated for the elbow  4 Weeks Postop- The patient may begin hand strengthening with soft putty including putty exercises for the intrinsics  Initially, the endurance building and strengthening is performed for 5 minute sessions  During subsequent weeks, the time may be extended, being sure to avoid complete muscle fatigue  6 Weeks Postop - PREs may be initiated to the elbow, forearm wrist and hand  Considerations for restrictions with strengthening include: avoid emphasis on strengthening the flexor carpi ulnaris and medial triceps  Anatomically, both occupy space surrounding the ulnar nerve  CONSIDERATIONS The patient may return to ADLs within the initial month following surgery  Work restrictions are common for 4-6 weeks with repetitive and physically demanding work tasks  In addition, gradually returning to sports at 8 weeks is recommended        Manuals 1/17            STM             MEM             Scar massage                          Neuro Re-Ed Ther Ex             HEP: Partial range AVANI, FDS gliding, TG, wrist AROM, elbow AROM                                                                                                       Ther Activity                                       Gait Training                                       Modalities             [facility] 5'

## 2023-01-26 ENCOUNTER — OFFICE VISIT (OUTPATIENT)
Dept: OCCUPATIONAL THERAPY | Facility: CLINIC | Age: 53
End: 2023-01-26

## 2023-01-26 DIAGNOSIS — G56.21 ULNAR NEURITIS, RIGHT: Primary | ICD-10-CM

## 2023-01-26 NOTE — PROGRESS NOTES
Daily Note     Today's date: 2023  Patient name: Shaina Suarez  : 1970  MRN: [id number]  Referring provider: yKlie Lucio MD  Dx:   Encounter Diagnosis     ICD-10-CM    1  Ulnar neuritis, right  G56 21                      Subjective: He reports he was using a drill and felt a sudden electrical pain into the small finger  It quickly subsided  He was cautioned not to perform resistive or aggravating activities and to monitor symptoms  Objective: See treatment diary below  Assessment: Moderately tight medial tricep  Minimal soft tissue tightness in the forearm flexors  Good healing and minimal scar tissue at the incision  Overall progressing as appropriate  Plan: HEP upgrade next visit including putty for strengthening  Precautions: DOS 23    7 Days Postop-The post-op compressive dressing is removed  Edema management with an elastic stockinette and a soft elbow pad to protect the elbow from direct pressure  Mid-Range active ROM exercises may be initiated to the elbow and forearm, along with full ROM to the wrist  To begin with mid-range motion and gradually increase the arc of motion over a day or two is encouraged  Gentle, short-arc nerve gliding exercises may be initiated 5-10 repetitions, 2-3 times a day  Activity modification for home 10 - 14 Days Postop The suture/staples are removed  [Note: Should the wound not be fully healed, it is recommended to wait an additional 3-5 days to remove the sutures  Wound separation on the posterior elbow will delay the recovery and may require Steri-Strips, until the wound heals] Scar massage; watch for fragility of scar line  Should there be mild adherence of the skin to the underlying subcutaneous tissue, the use of the kinesio tape may prove to be beneficial  Desensitization HEP prn  Edema management  Full arc AROM exercises may be initiated to the elbow and forearm   HEP including scar massage, progressive endurance building and patient education for return to normal activity  Thermal ultrasound as needed for medial elbow scar - 3mhz,  8w/cm2, 8 mins continuous     3 Weeks Postop-AAROM and PROM may be initiated for the elbow  4 Weeks Postop- The patient may begin hand strengthening with soft putty including putty exercises for the intrinsics  Initially, the endurance building and strengthening is performed for 5 minute sessions  During subsequent weeks, the time may be extended, being sure to avoid complete muscle fatigue  6 Weeks Postop - PREs may be initiated to the elbow, forearm wrist and hand  Considerations for restrictions with strengthening include: avoid emphasis on strengthening the flexor carpi ulnaris and medial triceps  Anatomically, both occupy space surrounding the ulnar nerve  CONSIDERATIONS The patient may return to ADLs within the initial month following surgery  Work restrictions are common for 4-6 weeks with repetitive and physically demanding work tasks  In addition, gradually returning to sports at 8 weeks is recommended        Manuals 1/17 1/26           STM  10'           MEM             Scar massage  5'                        Neuro Re-Ed             AVANI  8'           MNG  5'                                                                            Ther Ex             HEP: Partial range AVANI, FDS gliding, TG, wrist AROM, elbow AROM                                                                                                       Ther Activity                                       Gait Training                                       Modalities             MHP 5' 5'

## 2023-02-01 ENCOUNTER — OFFICE VISIT (OUTPATIENT)
Dept: OCCUPATIONAL THERAPY | Facility: CLINIC | Age: 53
End: 2023-02-01

## 2023-02-01 DIAGNOSIS — G56.21 ULNAR NEURITIS, RIGHT: Primary | ICD-10-CM

## 2023-02-01 NOTE — PROGRESS NOTES
Daily Note     Today's date: 2023  Patient name: Eric Morgan  : 1970  MRN: [id number]  Referring provider: Nidia Artis MD  Dx:   Encounter Diagnosis     ICD-10-CM    1  Ulnar neuritis, right  G56 21                      Subjective: "it has been good" I haven't been using a drill     Objective: See treatment diary below  Assessment: Issued theraputty for hand strength to begin today and also wrist strengthening with theraband to begin in 2 weeks  Minimal tightness in the tricep and the forearm flexors  Full motion in the elbow  Healing well overall with minimal scar tissue adhesion  Plan: Return as needed  Precautions: DOS 23    7 Days Postop-The post-op compressive dressing is removed  Edema management with an elastic stockinette and a soft elbow pad to protect the elbow from direct pressure  Mid-Range active ROM exercises may be initiated to the elbow and forearm, along with full ROM to the wrist  To begin with mid-range motion and gradually increase the arc of motion over a day or two is encouraged  Gentle, short-arc nerve gliding exercises may be initiated 5-10 repetitions, 2-3 times a day  Activity modification for home 10 - 14 Days Postop The suture/staples are removed  [Note: Should the wound not be fully healed, it is recommended to wait an additional 3-5 days to remove the sutures  Wound separation on the posterior elbow will delay the recovery and may require Steri-Strips, until the wound heals] Scar massage; watch for fragility of scar line  Should there be mild adherence of the skin to the underlying subcutaneous tissue, the use of the kinesio tape may prove to be beneficial  Desensitization HEP prn  Edema management  Full arc AROM exercises may be initiated to the elbow and forearm  HEP including scar massage, progressive endurance building and patient education for return to normal activity   Thermal ultrasound as needed for medial elbow scar - 3mhz,  8w/cm2, 8 mins continuous     3 Weeks Postop-AAROM and PROM may be initiated for the elbow  4 Weeks Postop- The patient may begin hand strengthening with soft putty including putty exercises for the intrinsics  Initially, the endurance building and strengthening is performed for 5 minute sessions  During subsequent weeks, the time may be extended, being sure to avoid complete muscle fatigue  6 Weeks Postop - PREs may be initiated to the elbow, forearm wrist and hand  Considerations for restrictions with strengthening include: avoid emphasis on strengthening the flexor carpi ulnaris and medial triceps  Anatomically, both occupy space surrounding the ulnar nerve  CONSIDERATIONS The patient may return to ADLs within the initial month following surgery  Work restrictions are common for 4-6 weeks with repetitive and physically demanding work tasks  In addition, gradually returning to sports at 8 weeks is recommended        Manuals 1/17 1/26 2/1          STM  10' 10'          MEM             Scar massage  5'                        Neuro Re-Ed             AVANI  8' 8'          MNG  5' 5'                                                                           Ther Ex             HEP: Partial range AVANI, FDS gliding, TG, wrist AROM, elbow AROM  upgraded as above                                                                                                     Ther Activity                                       Gait Training                                       Modalities             MHP 5' 5' 5'

## 2024-09-18 ENCOUNTER — RA CDI HCC (OUTPATIENT)
Dept: OTHER | Facility: HOSPITAL | Age: 54
End: 2024-09-18

## 2024-09-18 NOTE — PROGRESS NOTES
F32.A Depression- found in active problem list - Can Depression be further classified using more specific code     HCC coding opportunities          Chart Reviewed number of suggestions sent to Provider: 1     Patients Insurance        Commercial Insurance: [employer]

## 2024-09-24 RX ORDER — CEFUROXIME AXETIL 500 MG/1
TABLET ORAL
COMMUNITY
End: 2024-09-25

## 2024-09-24 RX ORDER — PROMETHAZINE HYDROCHLORIDE AND CODEINE PHOSPHATE 6.25; 1 MG/5ML; MG/5ML
SYRUP ORAL
COMMUNITY
End: 2024-09-25

## 2024-09-25 ENCOUNTER — OFFICE VISIT (OUTPATIENT)
Dept: FAMILY MEDICINE CLINIC | Facility: CLINIC | Age: 54
End: 2024-09-25
Payer: COMMERCIAL

## 2024-09-25 VITALS
WEIGHT: 185 LBS | SYSTOLIC BLOOD PRESSURE: 122 MMHG | BODY MASS INDEX: 26.48 KG/M2 | HEIGHT: 70 IN | DIASTOLIC BLOOD PRESSURE: 78 MMHG | RESPIRATION RATE: 16 BRPM | HEART RATE: 70 BPM | OXYGEN SATURATION: 97 %

## 2024-09-25 DIAGNOSIS — E78.2 MIXED HYPERLIPIDEMIA: ICD-10-CM

## 2024-09-25 DIAGNOSIS — Z00.00 WELL ADULT EXAM: Primary | ICD-10-CM

## 2024-09-25 DIAGNOSIS — R53.83 FATIGUE, UNSPECIFIED TYPE: ICD-10-CM

## 2024-09-25 DIAGNOSIS — E53.8 B12 DEFICIENCY: ICD-10-CM

## 2024-09-25 DIAGNOSIS — G47.33 OBSTRUCTIVE SLEEP APNEA: ICD-10-CM

## 2024-09-25 DIAGNOSIS — Z23 NEED FOR VACCINATION: ICD-10-CM

## 2024-09-25 DIAGNOSIS — Z12.5 SCREENING FOR PROSTATE CANCER: ICD-10-CM

## 2024-09-25 DIAGNOSIS — B35.6 TINEA CRURIS: ICD-10-CM

## 2024-09-25 DIAGNOSIS — E55.9 VITAMIN D DEFICIENCY: ICD-10-CM

## 2024-09-25 DIAGNOSIS — F31.89 OTHER BIPOLAR DISORDER (HCC): ICD-10-CM

## 2024-09-25 DIAGNOSIS — R79.89 LOW TESTOSTERONE: ICD-10-CM

## 2024-09-25 PROCEDURE — 99386 PREV VISIT NEW AGE 40-64: CPT | Performed by: FAMILY MEDICINE

## 2024-09-25 PROCEDURE — 90673 RIV3 VACCINE NO PRESERV IM: CPT | Performed by: FAMILY MEDICINE

## 2024-09-25 PROCEDURE — 90471 IMMUNIZATION ADMIN: CPT | Performed by: FAMILY MEDICINE

## 2024-09-25 NOTE — PROGRESS NOTES
Ambulatory Visit  Name: Jose Alberto Penaloza      : 1970      MRN: [id number]  Encounter Provider: Tab Adams MD  Encounter Date: 2024   Encounter department: [facility]    Assessment & Plan  Well adult exam    Orders:    CBC and differential    Comprehensive metabolic panel    Lipid Panel with Direct LDL reflex    Vitamin D 25 hydroxy    Vitamin B12    TSH, 3rd generation with Free T4 reflex    Testosterone, free, total; Future    PSA, Total Screen; Future    Obstructive sleep apnea    Orders:    Ambulatory Referral to Sleep Medicine; Future    CBC and differential    Comprehensive metabolic panel    Lipid Panel with Direct LDL reflex    Vitamin D 25 hydroxy    Vitamin B12    TSH, 3rd generation with Free T4 reflex    Testosterone, free, total; Future    PSA, Total Screen; Future    Other bipolar disorder (HCC)  -stable/controlled, continue same medication. Will evaluate again next visit            Mixed hyperlipidemia    Orders:    Lipid Panel with Direct LDL reflex    Vitamin D deficiency    Orders:    Vitamin D 25 hydroxy    B12 deficiency    Orders:    Vitamin B12    Fatigue, unspecified type    Orders:    Testosterone, free, total; Future    Low testosterone    Orders:    Testosterone, free, total; Future    Screening for prostate cancer    Orders:    PSA, Total Screen; Future    Assessment & Plan  1. Sleep Apnea.  He continues to experience snoring and difficulty using the CPAP machine despite undergoing sinus surgery in . A repeat sleep study will be conducted to reassess his condition. He also exhibits mild polycythemia. A complete blood count (CBC) will be rechecked along with routine tests for testosterone, thyroid, prostate, B12, vitamin D, cholesterol, sugar, kidney, and liver function.    2. Jock Itch.  Lotrimin cream should be applied twice daily for a period of 14 to 21 days. He is advised to wear cotton underwear to prevent further irritation. If there are any  changes in his condition, he should inform the office promptly.    3. Medication Management.  He is currently on Wellbutrin and Lexapro, prescribed by Dr. Spencer. He has been stable on these medications and will continue with the current regimen.    4. Health Maintenance.  An influenza vaccine will be administered today.        Need for vaccination    Orders:    influenza vaccine, recombinant, PF, 0.5 mL IM (Flublok)    Tinea cruris           Depression Screening and Follow-up Plan: Patient's depression screening was positive with a PHQ-9 score of 15. Patient assessed for underlying major depression. Brief counseling provided and recommend additional follow-up/re-evaluation next office visit. Patient declines further evaluation by mental health professional and/or medications. Brief counseling provided. Will re-evaluate at next office visit.         History of Present Illness     History of Present Illness  The patient is a 54-year-old male who presents for evaluation of multiple medical concerns.    He has been diagnosed with sleep apnea and has been using a CPAP machine, which he finds uncomfortable due to the forced air. Despite undergoing surgery for septal deviation, his symptoms have not improved. He continues to snore and has not been retested post-surgery. He also reports an increase in red blood cell production, which he attributes to his sleep apnea.    He recently tested positive for a sexually transmitted disease (STD) and experienced severe jock itch over the summer. He noticed red marks on his hands and itching in his groin area. After a week of medication, the symptoms on his hands have cleared up and the itching in his groin is improving. He has been using Lotrimin cream for treatment. He maintains good hygiene, showering twice daily, and wears cotton underwear.    He is seeking a new primary care physician as his current one is retiring.    He is on Wellbutrin and Lexapro, prescribed by Dr. Jeffries  "Tj, whom he sees 4 times a year. 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    SOCIAL HISTORY  The patient does not drink alcohol.     Review of Systems   Constitutional:  Negative for activity change, appetite change and fever.   HENT:  Negative for congestion, nosebleeds and trouble swallowing.    Eyes:  Negative for itching.   Respiratory:  Negative for cough and chest tightness.    Cardiovascular:  Negative for chest pain and palpitations.   Gastrointestinal:  Negative for abdominal pain, constipation, diarrhea and nausea.   Endocrine: Negative for cold intolerance.   Genitourinary:  Negative for frequency.   Musculoskeletal:  Negative for gait problem and joint swelling.   Skin:  Negative for rash.   Allergic/Immunologic: Negative for immunocompromised state.   Neurological:  Negative for dizziness, tremors, seizures, syncope and headaches.   Psychiatric/Behavioral:  Negative for hallucinations and suicidal ideas.      Objective     /78   Pulse 70   Resp 16   Ht 5' 10\" (1.778 m)   Wt 83.9 kg (185 lb)   SpO2 97%   BMI 26.54 kg/m²     Physical Exam    Physical Exam  Vitals and nursing note reviewed.   Constitutional:       Appearance: He is well-developed.   HENT:      Head: Normocephalic and atraumatic.   Eyes:      Conjunctiva/sclera: Conjunctivae normal.      Pupils: Pupils are equal, round, and reactive to light.   Cardiovascular:      Rate and Rhythm: Normal rate and regular rhythm.      Heart sounds: Normal heart sounds.   Pulmonary:      Effort: Pulmonary effort is normal.      Breath sounds: Normal breath sounds. No wheezing or rales.   Abdominal:      General: Bowel sounds are normal. There is no distension.      Palpations: Abdomen is soft.      Tenderness: There is no abdominal tenderness.   Musculoskeletal:         General: No tenderness. Normal range of motion.      Cervical back: Normal range of motion and neck supple.   Skin:     General: Skin is warm and dry.      Findings: No " rash.   Neurological:      Mental Status: He is alert and oriented to person, place, and time.      Cranial Nerves: No cranial nerve deficit.      Sensory: No sensory deficit.      Coordination: Coordination normal.   Psychiatric:         Behavior: Behavior normal.         Thought Content: Thought content normal.         Judgment: Judgment normal.

## 2024-09-25 NOTE — ASSESSMENT & PLAN NOTE
Orders:    Ambulatory Referral to Sleep Medicine; Future    CBC and differential    Comprehensive metabolic panel    Lipid Panel with Direct LDL reflex    Vitamin D 25 hydroxy    Vitamin B12    TSH, 3rd generation with Free T4 reflex    Testosterone, free, total; Future    PSA, Total Screen; Future

## 2024-10-01 ENCOUNTER — TRANSCRIBE ORDERS (OUTPATIENT)
Dept: SLEEP CENTER | Facility: CLINIC | Age: 54
End: 2024-10-01

## 2024-10-01 DIAGNOSIS — G47.33 OBSTRUCTIVE SLEEP APNEA: Primary | ICD-10-CM

## 2024-10-01 LAB
25(OH)D3 SERPL-MCNC: 44 NG/ML (ref 30–100)
ALBUMIN SERPL-MCNC: 4.3 G/DL (ref 3.6–5.1)
ALBUMIN/GLOB SERPL: 1.9 (CALC) (ref 1–2.5)
ALP SERPL-CCNC: 84 U/L (ref 35–144)
ALT SERPL-CCNC: 14 U/L (ref 9–46)
AST SERPL-CCNC: 21 U/L (ref 10–35)
BASOPHILS # BLD AUTO: 38 CELLS/UL (ref 0–200)
BASOPHILS NFR BLD AUTO: 0.7 %
BILIRUB SERPL-MCNC: 0.7 MG/DL (ref 0.2–1.2)
BUN SERPL-MCNC: 14 MG/DL (ref 7–25)
BUN/CREAT SERPL: ABNORMAL (CALC) (ref 6–22)
CALCIUM SERPL-MCNC: 9.4 MG/DL (ref 8.6–10.3)
CHLORIDE SERPL-SCNC: 103 MMOL/L (ref 98–110)
CHOLEST SERPL-MCNC: 156 MG/DL
CHOLEST/HDLC SERPL: 3.3 (CALC)
CO2 SERPL-SCNC: 30 MMOL/L (ref 20–32)
CREAT SERPL-MCNC: 1.02 MG/DL (ref 0.7–1.3)
EOSINOPHIL # BLD AUTO: 108 CELLS/UL (ref 15–500)
EOSINOPHIL NFR BLD AUTO: 2 %
ERYTHROCYTE [DISTWIDTH] IN BLOOD BY AUTOMATED COUNT: 13.4 % (ref 11–15)
GFR/BSA.PRED SERPLBLD CYS-BASED-ARV: 87 ML/MIN/1.73M2
GLOBULIN SER CALC-MCNC: 2.3 G/DL (CALC) (ref 1.9–3.7)
GLUCOSE SERPL-MCNC: 110 MG/DL (ref 65–99)
HCT VFR BLD AUTO: 48.9 % (ref 38.5–50)
HDLC SERPL-MCNC: 47 MG/DL
HGB BLD-MCNC: 17 G/DL (ref 13.2–17.1)
LDLC SERPL CALC-MCNC: 88 MG/DL (CALC)
LYMPHOCYTES # BLD AUTO: 1404 CELLS/UL (ref 850–3900)
LYMPHOCYTES NFR BLD AUTO: 26 %
MCH RBC QN AUTO: 30.4 PG (ref 27–33)
MCHC RBC AUTO-ENTMCNC: 34.8 G/DL (ref 32–36)
MCV RBC AUTO: 87.3 FL (ref 80–100)
MONOCYTES # BLD AUTO: 481 CELLS/UL (ref 200–950)
MONOCYTES NFR BLD AUTO: 8.9 %
NEUTROPHILS # BLD AUTO: 3370 CELLS/UL (ref 1500–7800)
NEUTROPHILS NFR BLD AUTO: 62.4 %
NONHDLC SERPL-MCNC: 109 MG/DL (CALC)
PLATELET # BLD AUTO: 217 THOUSAND/UL (ref 140–400)
PMV BLD REES-ECKER: 10 FL (ref 7.5–12.5)
POTASSIUM SERPL-SCNC: 4.7 MMOL/L (ref 3.5–5.3)
PROT SERPL-MCNC: 6.6 G/DL (ref 6.1–8.1)
PSA SERPL-MCNC: 1.47 NG/ML
RBC # BLD AUTO: 5.6 MILLION/UL (ref 4.2–5.8)
SODIUM SERPL-SCNC: 140 MMOL/L (ref 135–146)
TESTOST FREE SERPL-MCNC: 84.8 PG/ML (ref 35–155)
TESTOST SERPL-MCNC: 684 NG/DL (ref 250–1100)
TRIGL SERPL-MCNC: 111 MG/DL
TSH SERPL-ACNC: 1.93 MIU/L (ref 0.4–4.5)
VIT B12 SERPL-MCNC: 300 PG/ML (ref 200–1100)
WBC # BLD AUTO: 5.4 THOUSAND/UL (ref 3.8–10.8)

## 2024-10-15 DIAGNOSIS — B35.6 TINEA CRURIS: Primary | ICD-10-CM

## 2024-10-15 RX ORDER — NYSTATIN 100000 U/G
CREAM TOPICAL 2 TIMES DAILY
Qty: 30 G | Refills: 0 | Status: SHIPPED | OUTPATIENT
Start: 2024-10-15 | End: 2024-10-29

## 2024-11-21 ENCOUNTER — TELEPHONE (OUTPATIENT)
Dept: GASTROENTEROLOGY | Facility: CLINIC | Age: 54
End: 2024-11-21

## 2024-11-21 DIAGNOSIS — Z86.0100 HX OF COLONIC POLYPS: ICD-10-CM

## 2024-11-21 DIAGNOSIS — K52.9 COLITIS: Primary | ICD-10-CM

## 2024-11-21 NOTE — TELEPHONE ENCOUNTER
Scheduled date of colonoscopy (as of today): 3/14/25  Physician performing colonoscopy: Dr Yan  Location of colonoscopy:   Bowel prep reviewed with patient: lauryn emailed  Instructions reviewed with patient by: ls  Clearances: n/a

## 2024-11-21 NOTE — TELEPHONE ENCOUNTER
Pt is due for a colonoscopy for hx of Colitis, hx of colonic polyps with Dr Yan.     11/21/24  Screened by: Naina Barth MA    Referring Provider Dr Yan    Pre- Screening:     There is no height or weight on file to calculate BMI.  Has patient been referred for a routine screening Colonoscopy? yes  Is the patient between 45-75 years old? yes      Previous Colonoscopy yes   If yes:    Date: recall     Facility:     Reason:        SCHEDULING STAFF: If the patient is between 45yrs-49yrs, please advise patient to confirm benefits/coverage with their insurance company for a routine screening colonoscopy, some insurance carriers will only cover at 50yrs or older. If the patient is over 75years old, please schedule an office visit.     Does the patient want to see a Gastroenterologist prior to their procedure OR are they having any GI symptoms? no    Has the patient been hospitalized or had abdominal surgery in the past 6 months? no    Does the patient use supplemental oxygen? no    Does the patient take Coumadin, Lovenox, Plavix, Elliquis, Xarelto, or other blood thinning medication? no    Has the patient had a stroke, cardiac event, or stent placed in the past year? no    SCHEDULING STAFF: If patient answers NO to above questions, then schedule procedure. If patient answers YES to above questions, then schedule office appointment.     If patient is between 45yrs - 49yrs, please advise patient that we will have to confirm benefits & coverage with their insurance company for a routine screening colonoscopy.

## 2024-11-27 ENCOUNTER — TELEPHONE (OUTPATIENT)
Age: 54
End: 2024-11-27

## 2024-11-27 NOTE — TELEPHONE ENCOUNTER
Called and spoke to pt to switch appt on 12/2 @ 11:30am to afternoon appt since provider will be unavailable in the am effective 12/2 due to hospital rounds. Pt acknowledged and appt was moved to 1:30pm that same afternoon.

## 2024-12-02 ENCOUNTER — OFFICE VISIT (OUTPATIENT)
Age: 54
End: 2024-12-02
Payer: COMMERCIAL

## 2024-12-02 ENCOUNTER — APPOINTMENT (OUTPATIENT)
Age: 54
End: 2024-12-02
Payer: COMMERCIAL

## 2024-12-02 VITALS — HEIGHT: 70 IN | BODY MASS INDEX: 26.48 KG/M2 | WEIGHT: 185 LBS

## 2024-12-02 DIAGNOSIS — S39.012A LUMBAR STRAIN, INITIAL ENCOUNTER: ICD-10-CM

## 2024-12-02 DIAGNOSIS — M54.16 LUMBAR RADICULOPATHY: ICD-10-CM

## 2024-12-02 DIAGNOSIS — M51.360 DEGENERATION OF INTERVERTEBRAL DISC OF LUMBAR REGION WITH DISCOGENIC BACK PAIN: Primary | ICD-10-CM

## 2024-12-02 PROCEDURE — 72110 X-RAY EXAM L-2 SPINE 4/>VWS: CPT

## 2024-12-02 PROCEDURE — 99203 OFFICE O/P NEW LOW 30 MIN: CPT | Performed by: PHYSICIAN ASSISTANT

## 2024-12-02 RX ORDER — MELOXICAM 15 MG/1
15 TABLET ORAL DAILY
Qty: 30 TABLET | Refills: 0 | Status: SHIPPED | OUTPATIENT
Start: 2024-12-02

## 2024-12-02 NOTE — PROGRESS NOTES
"Patient Name:  Jose Alberto Penaloza  MRN:  [id number]    Assessment & Plan     Lumbar strain after weight lifting incident 3 weeks ago.  Lumbar DDD.  No red flags appreciated examination today.  Prescription for meloxicam.  Referral to physical therapy  Gradually return to activities as tolerated.  Patient can continue chiropractic care and yoga.  Follow-up in 6 weeks.  Consider advanced imaging at that time as appropriate.    Chief Complaint     Low back pain    History of the Present Illness     Jose Alberto Penaloza is a 54 y.o. male who reports to the office today for evaluation of his low back.  He notes an onset of pain approximately 3 weeks ago.  He denies any injury or trauma but states the pain began while working out with his .  He states he was performing bent over rows when he felt a pop in his left side lumbar spine at an onset of severe pain.  Initially pain was severe and rated 10 out of 10 in intensity.  Pain does intermittently radiate into the left buttock region.  He denies any radiation distally along the left lower extremity.  He denies any weakness or numbness and tingling the bilateral lower extremities.  Pain is worse with increased activity as well as lying flat.  Currently pain is 2 out of 10 in intensity but can increase to 4 out of 10 intensity.  He did initially take some Aleve which helped somewhat.  He has been working with a chiropractor as well as performing yoga with some improvement.  He states he is also been utilizing marijuana to help his pain.  He denies any bowel or bladder dysfunction.  No saddle paresthesias.  No fevers or chills.    Physical Exam     Ht 5' 10\" (1.778 m)   Wt 83.9 kg (185 lb)   BMI 26.54 kg/m²     Lumbar spine: No gross deformity.  No tenderness midline and paraspinal musculature bilaterally.  No tenderness bilateral SI joints and bilateral piriformis musculature.  Motor/sensation intact L2-S1 bilaterally with 5 out of 5 strength.  Negative " straight leg raise bilaterally.  Negative RIGOBERTO test on the right.  Slight discomfort in the left lumbar spine with RIGOBERTO test on the left.    Eyes: Anicteric sclerae.  ENT: Trachea midline.  Lungs: Normal respiratory effort.  CV: Capillary refill is less than 2 seconds.  Skin: Intact without erythema.  Lymph: No palpable lymphadenopathy.  Neuro: Sensation is grossly intact to light touch.  Psych: Mood and affect are appropriate.    Data Review     I have personally reviewed pertinent films in PACS, and my interpretation follows:    X-rays lumbar spine 2024: No acute osseous abnormality.  Significant DDD appreciated at L5-S1 with grade 1 spinal listhesis and evidence of likely prior pars injury.    Past Medical History:   Diagnosis Date    Allergic     Allergic rhinitis     Anxiety     Arthritis     Colon polyp     CPAP (continuous positive airway pressure) dependence     CURRENTLY NOT USING DUE TO RECALL    Depression     Seasonal allergies     Sleep apnea        Past Surgical History:   Procedure Laterality Date    APPENDECTOMY      COLONOSCOPY      EGD      HERNIA REPAIR       & 2016    ULNAR NERVE TRANSPOSITION Right 2023    WISDOM TOOTH EXTRACTION         No Known Allergies    Current Outpatient Medications on File Prior to Visit   Medication Sig Dispense Refill    buPROPion (WELLBUTRIN SR) 100 mg 12 hr tablet TAKE 1 TAB IN AM WITH FOOD      CANNABIDIOL PO cannabidiol (CBD) extract      escitalopram (LEXAPRO) 20 mg tablet Take 20 mg by mouth daily      nystatin (MYCOSTATIN) cream Apply topically 2 (two) times a day for 14 days 30 g 0    polyethylene glycol (GOLYTELY) 4000 mL solution Take 4,000 mL by mouth once for 1 dose 4000 mL 0     No current facility-administered medications on file prior to visit.       Social History     Tobacco Use    Smoking status: Former     Current packs/day: 0.00     Types: Cigars, Cigarettes     Quit date: 10/1/2019     Years since quittin.1    Smokeless  tobacco: Never    Tobacco comments:     on occasion   Vaping Use    Vaping status: Former   Substance Use Topics    Alcohol use: Not Currently    Drug use: Never       Family History   Problem Relation Age of Onset    Cancer Mother     Vision loss Mother     Anxiety disorder Mother     Depression Father     Diabetes Father     Cancer Sister     Anxiety disorder Sister        Review of Systems     As stated in the HPI. All other systems reviewed and are negative.

## 2024-12-03 RX ORDER — KETOCONAZOLE 20 MG/G
CREAM TOPICAL
COMMUNITY
Start: 2024-10-28

## 2024-12-03 RX ORDER — FLUOCINOLONE ACETONIDE 0.25 MG/G
CREAM TOPICAL
COMMUNITY
Start: 2024-10-28

## 2024-12-04 ENCOUNTER — OFFICE VISIT (OUTPATIENT)
Dept: FAMILY MEDICINE CLINIC | Facility: CLINIC | Age: 54
End: 2024-12-04

## 2024-12-04 VITALS
OXYGEN SATURATION: 99 % | HEART RATE: 55 BPM | HEIGHT: 70 IN | RESPIRATION RATE: 16 BRPM | BODY MASS INDEX: 26.77 KG/M2 | SYSTOLIC BLOOD PRESSURE: 122 MMHG | WEIGHT: 187 LBS | DIASTOLIC BLOOD PRESSURE: 76 MMHG

## 2024-12-04 DIAGNOSIS — R10.32 LLQ PAIN: Primary | ICD-10-CM

## 2024-12-04 DIAGNOSIS — Z87.19 HISTORY OF BILATERAL INGUINAL HERNIA REPAIR: ICD-10-CM

## 2024-12-04 DIAGNOSIS — Z98.890 HISTORY OF BILATERAL INGUINAL HERNIA REPAIR: ICD-10-CM

## 2024-12-04 RX ORDER — CEFUROXIME AXETIL 500 MG/1
1 TABLET ORAL 2 TIMES DAILY
COMMUNITY

## 2024-12-04 RX ORDER — ZOLPIDEM TARTRATE 10 MG/1
.5-1 TABLET ORAL
COMMUNITY

## 2024-12-04 RX ORDER — FLUTICASONE PROPIONATE 50 MCG
SPRAY, SUSPENSION (ML) NASAL
COMMUNITY

## 2024-12-04 NOTE — PROGRESS NOTES
Name: Jose Alberto Penaloza      : 1970      MRN: [id number]  Encounter Provider: Tab Adams MD  Encounter Date: 2024   Encounter department: [facility]    Assessment & Plan  [facility] pain    Orders:    CT abdomen pelvis wo contrast; Future       Assessment & Plan  1. Left lower quadrant pain.  The clinical presentation suggests a potential recurrence of a hernia. He reports pain in the lower left abdomen, especially when coughing, getting out of bed, or bending over. There is a history of inguinal hernia repair on both sides about 10 years ago. Physical examination reveals a bulge on the left side, indicating a possible hernia. A CT scan of the abdomen and pelvis without contrast will be ordered to further investigate the cause of the pain. If the CT scan confirms a hernia, a referral to a general surgeon will be made for potential surgical intervention. If the scan does not reveal any abnormalities, the focus will shift towards addressing the musculoskeletal aspects of the condition, including chiropractic care, muscle manipulation, massages, and medications.    PROCEDURE  The patient underwent inguinal hernia repairs on both sides approximately 10 years ago. Additionally, a hernia repair on the left side was performed when the patient was 1 years old.        History of Present Illness     History of Present Illness  The patient presents for evaluation of left lower quadrant pain.    He reports experiencing intermittent pain in his left lower abdomen, which he suspects may be due to a hernia recurrence. The pain is particularly noticeable during activities such as coughing, rising from bed, bending over, or lifting objects. He has been managing this pain for several weeks. He also mentions that the pain was present during his workout sessions, but it was tolerable. He expresses a desire to avoid surgical intervention if possible. He has been engaging in physical therapy for his back  "and plans to continue his exercise regimen, including running. He has been prescribed medication for his back, but is uncertain of its efficacy in alleviating the pain. He has undergone inguinal hernia repairs on both sides approximately 10 years ago. A few months prior, while working with a , he experienced groin and back pain. He recalls that his last episode of similar symptoms progressively worsened, even as he continued his workout routine. He also mentions a previous hernia repair on his left side when he was 1 years old, after which he was informed of a 60% chance of requiring bilateral hernia repairs in adulthood. He has observed no palpable mass in the area, despite shaving the region.    Supplemental Information  He is currently taking a medication for alopecia areata, which aids in hair regrowth. His last dose was administered 4 months ago.    MEDICATIONS  Current: metaxalone     Review of Systems   Gastrointestinal:  Positive for abdominal pain.     Objective   /76   Pulse 55   Resp 16   Ht 5' 10\" (1.778 m)   Wt 84.8 kg (187 lb)   SpO2 99%   BMI 26.83 kg/m²     Physical Exam    Physical Exam  Vitals and nursing note reviewed.   Constitutional:       Appearance: He is well-developed.   HENT:      Head: Normocephalic and atraumatic.   Eyes:      Conjunctiva/sclera: Conjunctivae normal.      Pupils: Pupils are equal, round, and reactive to light.   Cardiovascular:      Rate and Rhythm: Normal rate and regular rhythm.      Heart sounds: Normal heart sounds.   Pulmonary:      Effort: Pulmonary effort is normal.      Breath sounds: Normal breath sounds. No wheezing or rales.   Abdominal:      General: Bowel sounds are normal. There is no distension.      Palpations: Abdomen is soft.      Tenderness: There is no abdominal tenderness.      Hernia: A hernia is present. Hernia is present in the left inguinal area.   Musculoskeletal:         General: No tenderness. Normal range of " motion.      Cervical back: Normal range of motion and neck supple.   Skin:     General: Skin is warm and dry.      Findings: No rash.   Neurological:      Mental Status: He is alert and oriented to person, place, and time.      Cranial Nerves: No cranial nerve deficit.      Sensory: No sensory deficit.      Coordination: Coordination normal.   Psychiatric:         Behavior: Behavior normal.         Thought Content: Thought content normal.         Judgment: Judgment normal.

## 2024-12-06 ENCOUNTER — TELEPHONE (OUTPATIENT)
Age: 54
End: 2024-12-06

## 2024-12-06 NOTE — TELEPHONE ENCOUNTER
Patient called and stated that Dr Adams ordered a CT scan for him and he is scheduled to go this Monday for it.  He stated that someone from  by the name of Kerri called him and stated that this is pending Authorization as his insurance company is making him get an US prior to the CT.  Patient stated that insurance will do a peer to peer as well.    Patient provided the information for [facility] who would conduct the peer to peer.    Please call patient today, as he would like to get this test done as scheduled on Monday and not go for an extra test of the US    [facility]  [phone]

## 2024-12-09 ENCOUNTER — APPOINTMENT (OUTPATIENT)
Dept: CT IMAGING | Facility: HOSPITAL | Age: 54
End: 2024-12-09
Payer: COMMERCIAL

## 2024-12-09 ENCOUNTER — HOSPITAL ENCOUNTER (OUTPATIENT)
Dept: ULTRASOUND IMAGING | Facility: HOSPITAL | Age: 54
Discharge: HOME | End: 2024-12-09
Payer: COMMERCIAL

## 2024-12-09 DIAGNOSIS — R10.32 LLQ PAIN: ICD-10-CM

## 2024-12-09 DIAGNOSIS — Z87.19 HISTORY OF BILATERAL INGUINAL HERNIA REPAIR: ICD-10-CM

## 2024-12-09 DIAGNOSIS — Z98.890 HISTORY OF BILATERAL INGUINAL HERNIA REPAIR: ICD-10-CM

## 2024-12-09 PROCEDURE — 76705 ECHO EXAM OF ABDOMEN: CPT

## 2024-12-11 ENCOUNTER — EVALUATION (OUTPATIENT)
Dept: PHYSICAL THERAPY | Facility: CLINIC | Age: 54
End: 2024-12-11
Payer: COMMERCIAL

## 2024-12-11 DIAGNOSIS — S39.012A LUMBAR STRAIN, INITIAL ENCOUNTER: ICD-10-CM

## 2024-12-11 DIAGNOSIS — M51.360 DEGENERATION OF INTERVERTEBRAL DISC OF LUMBAR REGION WITH DISCOGENIC BACK PAIN: ICD-10-CM

## 2024-12-11 PROCEDURE — 97162 PT EVAL MOD COMPLEX 30 MIN: CPT

## 2024-12-11 NOTE — PROGRESS NOTES
PT Evaluation     Today's date: 2024  Patient name: Jose Alberto Penaloza  : 1970  MRN: [id number]  Referring provider: Jono Moses P*  Dx:   Encounter Diagnosis     ICD-10-CM    1. Degeneration of intervertebral disc of lumbar region with discogenic back pain  M51.360 Ambulatory Referral to Physical Therapy      2. Lumbar strain, initial encounter  S39.012A Ambulatory Referral to Physical Therapy          Start Time: 0830  Stop Time: 915  Total time in clinic (min): 45 minutes    Assessment  Symptom irritability: moderate    Assessment details: Jose Alberto Penaloza is a pleasant 54 y.o. male who presents with low back pain.  The patient's greatest concerns are worry over not knowing what's wrong, wanting to avoid painkillers, fear of not being able to keep active, and future ill health (and wanting to prevent it).      No further referral appears necessary at this time based upon examination results.    Primary movement impairment diagnosis of decreased ROM resulting in pathoanatomical symptoms of decreased strength and limiting his ability to carry, dress independently, drive, exercise or recreation, get out of a chair, lift, perform household chores, perform yard work, shop, sit, sleep, squat to  objects from the floor, stand, and walk.    Primary Impairments:  1) lumbar hypomobility  2) decreased core and LE strength    Etiologic factors include recent heavy lifting.    Understanding of Dx/Px/POC: good     Prognosis: good  Prognosis details: Positive prognostic indicators include positive attitude toward recovery and good understanding of diagnosis and treatment plan options.  Negative prognostic indicators include chronicity of symptoms, high symptom irritability, minimal changes in pain with movement, and multiple concurrent orthopedic problems.      Goals  Impairment Goals 4-6 weeks  - Decrease pain to <3/10  - Improve lumbar AROM to >100% throughout  - Increase hip strength to 5/5  throughout  - Increase core strength to be able to sit straight up without deviation    Functional Goals 6-8 weeks  - Return to Prior Level of Function  - Patient will be independent with HEP  - Patient will be able to sit without increased pain/compensation/difficulty  - Patient will be able to perform sit to stand without increased pain/compensation/difficulty   - Patient will be able to bend forward without increased pain/compensation/difficulty   - Patient will be able to lift >40 pounds with proper mechanics and no pain      Plan  Patient would benefit from: skilled physical therapy  Planned modality interventions: Modalities PRN.    Planned therapy interventions: activity modification, manual therapy, neuromuscular re-education, patient education, therapeutic activities, therapeutic exercise, graded activity, home exercise program, behavior modification, self care, IASTM and joint mobilization    Frequency: 2x week  Duration in weeks: 8  Treatment plan discussed with: patient        Subjective Evaluation    History of Present Illness  Mechanism of injury: WORK/SCHOOL: retired,   HOME LIFE: dog, steps to basement  HOBBIES/EXERCISE: yoga, strength training, woodworking, music, art, cinema, museums  PLOF:  no prior low back injuries  HISTORY OF CURRENT INJURY:  Pt had joined a gym and when he was working on kinkon over rows, he felt a pop in the low back.  He had pain that was very intense for 2-3 weeks.  He was able to go to yoga and the chiropractor that helped to decrease pain.  He reports that the pain will come and go  PAIN LOCATION/DESCRIPTORS: L to center, radiates up, does not radiate down the LE  AGGRAVATING FACTORS:  bending, laying (no comfortable position), wakes at night, lifting and twisting (shoveling), carrying water buckets for watering plants, tightness within HS area,   EASES: supplements,   DAY PATTERN: sleeping,   IMAGING:  x-ray, arthritis in low back  Jose Alberto denies a new onset of Bladder  incontinence, Bowel dysfunction, Dizziness, Dysphagia, Dysarthria, Drop attacks, Diplopia, Nausea, Ataxia, Recent unexplained weight loss, Clumsy or unsteadiness, Constant night pain, History of cancer, Tingling, Numbness, and Saddle anesthesia .  PATIENT GOALS: decrease back pain to the best he can, learn how to avoid this happening again.     Patient Goals  Patient goals for therapy: decreased pain, decreased edema, independence with ADLs/IADLs, return to sport/leisure activities, increased motion and increased strength    Pain  Current pain ratin  At best pain ratin  At worst pain rating: 10  Quality: tight, sharp and radiating  Relieving factors: relaxation and rest  Aggravating factors: lifting, overhead activity, stair climbing and standing          Objective     Postural Observations    Additional Postural Observation Details  Pt will return to poor posture when sitting for periods of time.     Palpation     Additional Palpation Details  TTP: L QL, lumbar PSMs,     Neurological Testing     Additional Neurological Details  No N/T or other neurological changes to note    Active Range of Motion     Additional Active Range of Motion Details  LS AROM:   Flexion: 80%  Extension: 75%  SideBending right: 100%  SideBending left: 100%  Rotation right: 100%  Rotation left: 100%        Joint Play     Hypomobile: T9, T10, T11, T12, L1, L2, L3, L4, L5 and S1     Pain: L2, L3, L4, L5 and S1     Strength/Myotome Testing     Left Hip   Planes of Motion   Flexion: 4  Extension: 3+  Abduction: 3+  Adduction: 4-    Right Hip   Planes of Motion   Flexion: 4  Extension: 4-  Abduction: 3+  Adduction: 4-    Left Ankle/Foot   Dorsiflexion: 4+    Right Ankle/Foot   Dorsiflexion: 4+    Tests     Lumbar     Left   Positive slump test.     Right   Positive slump test.     General Comments:    Lower quarter screen   Knees: unremarkable  Foot/ankle: unremarkable               POC Expires    Auth Status    Unit limit 3    Expiration date 12/31/   PT/OT Limit 30       Diagnosis:  Low back pain   Precautions:     Comparable signs    Primary Impairments:    Patient Goals    Date 12/11       Used        Remaining        Manual Therapy           Lumbar mobs                                                       Exercise Diary              Therapeutic Exercise             LTR           HS stretch           Calf stretch           DKTC           Brian stretch                       Bike             Neuromuscular Re-education             Pelvic tilt           Bridge           SLR           RDL           Deadlift           Squat                       Re-Eval             Therapeutic Activities             Education                                                                    Modalities

## 2024-12-12 ENCOUNTER — RESULTS FOLLOW-UP (OUTPATIENT)
Dept: FAMILY MEDICINE CLINIC | Facility: CLINIC | Age: 54
End: 2024-12-12

## 2024-12-12 DIAGNOSIS — Z98.890 HISTORY OF BILATERAL INGUINAL HERNIA REPAIR: ICD-10-CM

## 2024-12-12 DIAGNOSIS — R10.32 LLQ PAIN: Primary | ICD-10-CM

## 2024-12-12 DIAGNOSIS — Z87.19 HISTORY OF BILATERAL INGUINAL HERNIA REPAIR: ICD-10-CM

## 2024-12-12 NOTE — TELEPHONE ENCOUNTER
----- Message from Tab Adams MD sent at 12/12/2024  2:44 PM EST -----  Ok try to get the CT scan done now   US done and no hernia seen   We need a different image   Ill order the CT pelvis as per my conversation with the insurance

## 2024-12-13 ENCOUNTER — OFFICE VISIT (OUTPATIENT)
Dept: PHYSICAL THERAPY | Facility: CLINIC | Age: 54
End: 2024-12-13
Payer: COMMERCIAL

## 2024-12-13 DIAGNOSIS — S39.012A LUMBAR STRAIN, INITIAL ENCOUNTER: ICD-10-CM

## 2024-12-13 DIAGNOSIS — M51.360 DEGENERATION OF INTERVERTEBRAL DISC OF LUMBAR REGION WITH DISCOGENIC BACK PAIN: Primary | ICD-10-CM

## 2024-12-13 PROCEDURE — 97140 MANUAL THERAPY 1/> REGIONS: CPT

## 2024-12-13 PROCEDURE — 97110 THERAPEUTIC EXERCISES: CPT

## 2024-12-13 PROCEDURE — 97112 NEUROMUSCULAR REEDUCATION: CPT

## 2024-12-13 NOTE — PROGRESS NOTES
"Daily Note     Today's date: 2024  Patient name: Jose Alberto Penaloza  : 1970  MRN: [id number]  Referring provider: Jono Moses P*  Dx:   Encounter Diagnosis     ICD-10-CM    1. Degeneration of intervertebral disc of lumbar region with discogenic back pain  M51.360       2. Lumbar strain, initial encounter  S39.012A                      Subjective: Pt reports that he is having less movement pain today as compared to his initial evaluation.       Objective: See treatment diary below      Assessment: Tolerated treatment well. Began with bike warm up with no increase in low back discomfort of pain. Added HS and calf stretching with good tolerance.  Pt had increased discomfort in mid lumbar with PA mobs though was able to decrease some intensity with continuation.  Pt had trouble relaxing LE musculature during PPU so reverted to hip sags against wall.  Added core and multifidi activation with pt having some discomfort when using full force, though after cuing was able to properly initiate muscles for multifidi activation.  Pt had no pain during rows or SAPD though and presented with good form.   Patient demonstrated fatigue post treatment, exhibited good technique with therapeutic exercises, and would benefit from continued PT      Plan: Continue per plan of care.        POC Expires    Auth Status    Unit limit 3   Expiration date /   PT/OT Limit 30       Diagnosis:  Low back pain   Precautions:     Comparable signs    Primary Impairments:    Patient Goals    Date       Used        Remaining        Manual Therapy           Lumbar mobs  AK                                                     Exercise Diary              Therapeutic Exercise             LTR  X10 B         HS stretch  10x10\"         Calf stretch  10x10\"         DKTC           Brian stretch           Hip sags  x10      PPU  Unable to relax LE musuclature                  Bike    5'         Neuromuscular Re-education    " "         Pelvic tilt  10x5\"         Multifidi activation  10x10\"      Bridge           SLR           RDL           Deadlift           Squat           Rows  2x10 mason 25#      SAPD  2x10 mason 20#                          Re-Eval             Therapeutic Activities             Education                                                                    Modalities                                   "

## 2024-12-16 ENCOUNTER — OFFICE VISIT (OUTPATIENT)
Dept: PHYSICAL THERAPY | Facility: CLINIC | Age: 54
End: 2024-12-16
Payer: COMMERCIAL

## 2024-12-16 DIAGNOSIS — S39.012A LUMBAR STRAIN, INITIAL ENCOUNTER: ICD-10-CM

## 2024-12-16 DIAGNOSIS — M51.360 DEGENERATION OF INTERVERTEBRAL DISC OF LUMBAR REGION WITH DISCOGENIC BACK PAIN: Primary | ICD-10-CM

## 2024-12-16 PROCEDURE — 97110 THERAPEUTIC EXERCISES: CPT

## 2024-12-16 PROCEDURE — 97112 NEUROMUSCULAR REEDUCATION: CPT

## 2024-12-16 PROCEDURE — 97140 MANUAL THERAPY 1/> REGIONS: CPT

## 2024-12-16 NOTE — PROGRESS NOTES
"Daily Note     Today's date: 2024  Patient name: Jose Alberto Penaloza  : 1970  MRN: [id number]  Referring provider: Jono Moses P*  Dx:   Encounter Diagnosis     ICD-10-CM    1. Degeneration of intervertebral disc of lumbar region with discogenic back pain  M51.360       2. Lumbar strain, initial encounter  S39.012A                      Subjective: Pt reports that he had a little soreness in his low back after last treatment session       Objective: See treatment diary below      Assessment: Tolerated treatment well. Began today with warm up on bike followed by manual interventions. Pt continues with stiffness into lower lumbar segments with some sharper pain that can radiate with grade 3 though grade 1-2 was tolerable.  Able to move into strengthening with emphasis on glute activation prior to movement with exercise to decrease low back activation and pain.  Able to add HS stretch as well as nerve glides to help with HS tightness.  Patient demonstrated fatigue post treatment, exhibited good technique with therapeutic exercises, and would benefit from continued PT      Plan: Continue per plan of care.        POC Expires    Auth Status    Unit limit 3   Expiration date /   PT/OT Limit 30       Diagnosis:  Low back pain   Precautions:     Comparable signs    Primary Impairments:    Patient Goals    Date      Used        Remaining        Manual Therapy           Lumbar mobs  AK  AK                                                   Exercise Diary              Therapeutic Exercise             LTR  X10 B  x10 ea       HS stretch  10x10\"  10x10\"       Calf stretch  10x10\"         DKTC           Brian stretch           Hip sags  x10      PPU  Unable to relax LE musuclature                  Bike    5'  5'       Neuromuscular Re-education             Pelvic tilt  10x5\"         Multifidi activation  10x10\"      Bridge    2x10 marches       SLR           RDL           Deadlift      "      Squat           Rows  2x10 mason 25#      SAPD  2x10 mason 20#      HS curls   2x10 RTB ea     Prone hip extension   2x10 B     S. Nerve glides   X15 B                         Re-Eval             Therapeutic Activities             Education                                                                    Modalities

## 2024-12-19 ENCOUNTER — HOSPITAL ENCOUNTER (OUTPATIENT)
Dept: CT IMAGING | Facility: HOSPITAL | Age: 54
End: 2024-12-19
Payer: COMMERCIAL

## 2024-12-19 DIAGNOSIS — R10.32 LLQ PAIN: ICD-10-CM

## 2024-12-19 DIAGNOSIS — Z87.19 HISTORY OF BILATERAL INGUINAL HERNIA REPAIR: ICD-10-CM

## 2024-12-19 DIAGNOSIS — Z98.890 HISTORY OF BILATERAL INGUINAL HERNIA REPAIR: ICD-10-CM

## 2024-12-19 PROCEDURE — 72193 CT PELVIS W/DYE: CPT

## 2024-12-19 RX ADMIN — IOHEXOL 50 ML: 350 INJECTION, SOLUTION INTRAVENOUS at 08:30

## 2024-12-20 ENCOUNTER — APPOINTMENT (OUTPATIENT)
Dept: PHYSICAL THERAPY | Facility: CLINIC | Age: 54
End: 2024-12-20
Payer: COMMERCIAL

## 2024-12-23 ENCOUNTER — OFFICE VISIT (OUTPATIENT)
Dept: PHYSICAL THERAPY | Facility: CLINIC | Age: 54
End: 2024-12-23
Payer: COMMERCIAL

## 2024-12-23 DIAGNOSIS — S39.012A LUMBAR STRAIN, INITIAL ENCOUNTER: ICD-10-CM

## 2024-12-23 DIAGNOSIS — R10.32 LLQ PAIN: Primary | ICD-10-CM

## 2024-12-23 DIAGNOSIS — M51.360 DEGENERATION OF INTERVERTEBRAL DISC OF LUMBAR REGION WITH DISCOGENIC BACK PAIN: Primary | ICD-10-CM

## 2024-12-23 PROCEDURE — 97110 THERAPEUTIC EXERCISES: CPT

## 2024-12-23 PROCEDURE — 97140 MANUAL THERAPY 1/> REGIONS: CPT

## 2024-12-23 PROCEDURE — 97112 NEUROMUSCULAR REEDUCATION: CPT

## 2024-12-23 NOTE — PROGRESS NOTES
"Daily Note     Today's date: 2024  Patient name: Jose Alberto Penaloza  : 1970  MRN: [id number]  Referring provider: Jono Moses P*  Dx:   Encounter Diagnosis     ICD-10-CM    1. Degeneration of intervertebral disc of lumbar region with discogenic back pain  M51.360       2. Lumbar strain, initial encounter  S39.012A                      Subjective: Pt reports that he has been getting some sharper pain in the low back.        Objective: See treatment diary below      Assessment: Tolerated treatment well. Began with bike warm up today followed by manual interventions.  He had some stiffness within full lumbar segments, though most discomfort within lower lumbar segments.  Added some nerve glides as well as figure 4 stretch with pt having some symptoms decreased afterward.  Added strengthening to pt's tolerance with no increase in symptoms. Patient demonstrated fatigue post treatment, exhibited good technique with therapeutic exercises, and would benefit from continued PT      Plan: Continue per plan of care.        POC Expires    Auth Status    Unit limit 3   Expiration date /   PT/OT Limit 30       Diagnosis:  Low back pain   Precautions:     Comparable signs    Primary Impairments:    Patient Goals    Date     Used        Remaining        Manual Therapy           Lumbar mobs  AK  AK  AK                                                 Exercise Diary              Therapeutic Exercise             LTR  X10 B  x10 ea  x20 ea     HS stretch  10x10\"  10x10\"       Calf stretch  10x10\"         DKTC           Brian stretch           Hip sags  x10      PPU  Unable to relax LE musuclature  x105'    SKTC    10x10\"    Figure 4     10x10\" ea                        Bike    5'  5'  5'     Neuromuscular Re-education             Pelvic tilt  10x5\"    10x5\", x10 c marches     Multifidi activation  10x10\"  10x10\"    Bridge    2x10 marches  2x10x5\"     SLR           RDL         "   Deadlift           Squat           Rows  2x10 mason 25#      SAPD  2x10 mason 20#      HS curls   2x10 RTB ea     Prone hip extension   2x10 B     S. Nerve glides   X15 B X10 ea                        Re-Eval             Therapeutic Activities             Education                                                                    Modalities

## 2024-12-30 ENCOUNTER — OFFICE VISIT (OUTPATIENT)
Dept: OBGYN CLINIC | Facility: CLINIC | Age: 54
End: 2024-12-30
Payer: COMMERCIAL

## 2024-12-30 ENCOUNTER — OFFICE VISIT (OUTPATIENT)
Dept: PHYSICAL THERAPY | Facility: CLINIC | Age: 54
End: 2024-12-30
Payer: COMMERCIAL

## 2024-12-30 VITALS — HEIGHT: 70 IN | WEIGHT: 187 LBS | BODY MASS INDEX: 26.77 KG/M2

## 2024-12-30 DIAGNOSIS — M51.360 DEGENERATION OF INTERVERTEBRAL DISC OF LUMBAR REGION WITH DISCOGENIC BACK PAIN: ICD-10-CM

## 2024-12-30 DIAGNOSIS — S39.012A LUMBAR STRAIN, INITIAL ENCOUNTER: ICD-10-CM

## 2024-12-30 DIAGNOSIS — M51.360 DEGENERATION OF INTERVERTEBRAL DISC OF LUMBAR REGION WITH DISCOGENIC BACK PAIN: Primary | ICD-10-CM

## 2024-12-30 DIAGNOSIS — R10.32 LLQ PAIN: Primary | ICD-10-CM

## 2024-12-30 PROCEDURE — 97140 MANUAL THERAPY 1/> REGIONS: CPT

## 2024-12-30 PROCEDURE — 97110 THERAPEUTIC EXERCISES: CPT

## 2024-12-30 PROCEDURE — 99214 OFFICE O/P EST MOD 30 MIN: CPT | Performed by: PHYSICAL MEDICINE & REHABILITATION

## 2024-12-30 PROCEDURE — 97112 NEUROMUSCULAR REEDUCATION: CPT

## 2024-12-30 RX ORDER — PREDNISONE 20 MG/1
40 TABLET ORAL
Qty: 10 TABLET | Refills: 0 | Status: SHIPPED | OUTPATIENT
Start: 2024-12-30 | End: 2025-01-04

## 2024-12-30 NOTE — PROGRESS NOTES
"Discharge     Today's date: 2024  Patient name: Jose Alberto Penaloza  : 1970  MRN: [id number]  Referring provider: Jono Moses P*  Dx:   Encounter Diagnosis     ICD-10-CM    1. Degeneration of intervertebral disc of lumbar region with discogenic back pain  M51.360       2. Lumbar strain, initial encounter  S39.012A                      Subjective: Pt reports that he hasn't had much of his back pain, more pain from his hernia.       Objective: See treatment diary below      Assessment: Andrade has been compliant with attending physical therapy and completing home exercise program since initial evaluation.  Andrade has made improvements in objective data since initial evaluation and has achieved all goals.  Patient reports having returned to their prior level of function. Patient provided with updated Home Exercise Program, all questions answered, and verbalized understanding, agreeing to plan of care. Thus it was mutually decided to discontinue this episode of care and transition to Home Exercise Program.         Plan: Continue per plan of care.        POC Expires    Auth Status    Unit limit 3   Expiration date /   PT/OT Limit 30       Diagnosis:  Low back pain   Precautions:     Comparable signs    Primary Impairments:    Patient Goals    Date    Used        Remaining        Manual Therapy           Lumbar mobs  AK  AK  AK  AK                                               Exercise Diary              Therapeutic Exercise             LTR  X10 B  x10 ea  x20 ea  x20 ea   HS stretch  10x10\"  10x10\"    10x10\"   Calf stretch  10x10\"      10x10\"   DKTC           Brian stretch        10x10\"   Hip sags  x10      PPU  Unable to relax LE musuclature  x105' x10   SKTC    10x10\"    Figure 4     10x10\" ea                        Bike    5'  5'  5'  5'   Neuromuscular Re-education             Pelvic tilt  10x5\"    10x5\", x10 c marches     Multifidi activation  10x10\"  10x10\"  " "  Bridge    2x10 marches  2x10x5\"     SLR           RDL           Deadlift           Squat           Rows  2x10 mason 25#      SAPD  2x10 mason 20#      HS curls   2x10 RTB ea     Prone hip extension   2x10 B     S. Nerve glides   X15 B X10 ea                        Re-Eval             Therapeutic Activities             Education                           Big stick walk outs          x5 ea 8#    Bent over rows          2x10 10#                 Modalities                                         "

## 2024-12-30 NOTE — PROGRESS NOTES
1. LLQ pain      No orders of the defined types were placed in this encounter.    New Medications Ordered This Visit   Medications    predniSONE 20 mg tablet     Sig: Take 2 tablets (40 mg total) by mouth daily with breakfast for 5 days     Dispense:  10 tablet     Refill:  0     Impression:  Left lower quadrant pain likely secondary to hip flexor tendinopathy versus inguinal hernia.  Less likely that this is due to athletic pubalgia or labral/NERI mediated.     Examination is notable for significant tenderness along the left iliopsoas tendon.  He has slight weakness compared to contralateral side with resisted hip flexion with concomitant knee extension.      We will schedule Andrade for an ultrasound-guided hip flexor injection for both diagnostic and therapeutic reasons.  He should also avoid strenuous movements for now.  In the interim, I prescribed him an oral steroid burst to help with the pain.  If he had continued pain afterwards, would consider consultation with general surgery.    Imaging Studies (I personally reviewed images in PACS and report):  CT pelvis with contrast from 12/19/2024 reviewed.  These images show loss of femoral head and neck sphericity consistent with cam type NERI and a small osteophyte just inferior to the lateral acetabular margin.      No follow-ups on file.    Patient is in agreement with the above plan.    HPI:  Jose Alberto Penaloza is a 54 y.o. male  who presents for evaluation of   Chief Complaint   Patient presents with    Abdominal Pain     LLQ      Onset/Mechanism: Pain started 1-2 months ago after he started to work with a .  Location: Left groin.  Radiation: Down lower into the groin.  Provocative: Lying on his side.  Putting on socks/shoes and getting in and out of a car.  Severity: Getting worse.  Associated Symptoms: Denies.  Treatment so far: No recent treatment.    Following history reviewed and updated:  Past Medical History:   Diagnosis Date    Allergic     Allergic  "rhinitis     Anxiety     Arthritis     Colon polyp     CPAP (continuous positive airway pressure) dependence     CURRENTLY NOT USING DUE TO RECALL    Depression     Seasonal allergies     Sleep apnea      Past Surgical History:   Procedure Laterality Date    APPENDECTOMY  2014    COLONOSCOPY      EGD      HERNIA REPAIR      2015 & 2016    ULNAR NERVE TRANSPOSITION Right 2023    WISDOM TOOTH EXTRACTION       Social History   Social History     Substance and Sexual Activity   Alcohol Use Not Currently     Social History     Substance and Sexual Activity   Drug Use Never     Social History     Tobacco Use   Smoking Status Former    Current packs/day: 0.00    Types: Cigars, Cigarettes    Quit date: 10/1/2019    Years since quittin.2   Smokeless Tobacco Never   Tobacco Comments    on occasion     Family History   Problem Relation Age of Onset    Cancer Mother     Vision loss Mother     Anxiety disorder Mother     Depression Father     Diabetes Father     Cancer Sister     Anxiety disorder Sister      No Known Allergies     Constitutional:  Ht 5' 10\" (1.778 m)   Wt 84.8 kg (187 lb)   BMI 26.83 kg/m²    General: NAD.  Eyes: Anicteric sclerae.  Neck: Supple.  Lungs: Unlabored breathing.  Cardiovascular: No lower extremity edema.  Skin: Intact without erythema.  Neurologic: Sensation intact to light touch.  Psychiatric: Mood and affect are appropriate.    Left Hip Exam     Tenderness   The patient is experiencing tenderness in the anterior.    Range of Motion   The patient has normal left hip ROM.  Internal rotation: normal     Tests   RIGOBERTO: positive    Other   Erythema: absent  Scars: absent  Sensation: normal  Pulse: present    Comments:  Normal log roll and FADDIR.  See impression/plan.             Procedures              "

## 2024-12-31 RX ORDER — MELOXICAM 15 MG/1
15 TABLET ORAL DAILY
Qty: 30 TABLET | Refills: 0 | Status: SHIPPED | OUTPATIENT
Start: 2024-12-31

## 2025-01-08 ENCOUNTER — APPOINTMENT (OUTPATIENT)
Dept: LAB | Facility: HOSPITAL | Age: 55
End: 2025-01-08
Payer: COMMERCIAL

## 2025-01-08 DIAGNOSIS — Z11.1 SCREENING EXAMINATION FOR PULMONARY TUBERCULOSIS: ICD-10-CM

## 2025-01-08 DIAGNOSIS — Z01.84 IMMUNITY STATUS TESTING: ICD-10-CM

## 2025-01-08 LAB
MEV IGG SER QL IA: NORMAL
MUV IGG SER QL IA: NORMAL
RUBV IGG SERPL IA-ACNC: <10 IU/ML
VZV IGG SER QL IA: NORMAL

## 2025-01-08 PROCEDURE — 86735 MUMPS ANTIBODY: CPT

## 2025-01-08 PROCEDURE — 86762 RUBELLA ANTIBODY: CPT

## 2025-01-08 PROCEDURE — 86765 RUBEOLA ANTIBODY: CPT

## 2025-01-08 PROCEDURE — 86480 TB TEST CELL IMMUN MEASURE: CPT

## 2025-01-08 PROCEDURE — 86787 VARICELLA-ZOSTER ANTIBODY: CPT

## 2025-01-08 PROCEDURE — 36415 COLL VENOUS BLD VENIPUNCTURE: CPT

## 2025-01-09 LAB
GAMMA INTERFERON BACKGROUND BLD IA-ACNC: 0.02 IU/ML
M TB IFN-G BLD-IMP: NEGATIVE
M TB IFN-G CD4+ BCKGRND COR BLD-ACNC: 0.01 IU/ML
M TB IFN-G CD4+ BCKGRND COR BLD-ACNC: 0.01 IU/ML
MITOGEN IGNF BCKGRD COR BLD-ACNC: 9.98 IU/ML

## 2025-01-17 ENCOUNTER — OFFICE VISIT (OUTPATIENT)
Dept: OBGYN CLINIC | Facility: CLINIC | Age: 55
End: 2025-01-17
Payer: COMMERCIAL

## 2025-01-17 VITALS — BODY MASS INDEX: 26.77 KG/M2 | WEIGHT: 187 LBS | HEIGHT: 70 IN

## 2025-01-17 DIAGNOSIS — R10.32 LLQ PAIN: Primary | ICD-10-CM

## 2025-01-17 PROCEDURE — 99213 OFFICE O/P EST LOW 20 MIN: CPT | Performed by: PHYSICAL MEDICINE & REHABILITATION

## 2025-01-17 PROCEDURE — 20611 DRAIN/INJ JOINT/BURSA W/US: CPT | Performed by: PHYSICAL MEDICINE & REHABILITATION

## 2025-01-17 RX ORDER — TRIAMCINOLONE ACETONIDE 40 MG/ML
80 INJECTION, SUSPENSION INTRA-ARTICULAR; INTRAMUSCULAR
Status: COMPLETED | OUTPATIENT
Start: 2025-01-17 | End: 2025-01-17

## 2025-01-17 RX ORDER — ROPIVACAINE HYDROCHLORIDE 5 MG/ML
10 INJECTION, SOLUTION EPIDURAL; INFILTRATION; PERINEURAL
Status: COMPLETED | OUTPATIENT
Start: 2025-01-17 | End: 2025-01-17

## 2025-01-17 RX ADMIN — ROPIVACAINE HYDROCHLORIDE 10 ML: 5 INJECTION, SOLUTION EPIDURAL; INFILTRATION; PERINEURAL at 11:00

## 2025-01-17 RX ADMIN — TRIAMCINOLONE ACETONIDE 80 MG: 40 INJECTION, SUSPENSION INTRA-ARTICULAR; INTRAMUSCULAR at 11:00

## 2025-01-17 NOTE — PROGRESS NOTES
1. LLQ pain  Ambulatory referral to Physical Therapy        Orders Placed This Encounter   Procedures    Large joint arthrocentesis    Ambulatory referral to Physical Therapy     Impression:  Patient is here in follow up of lower quadrant pain likely secondary to hip flexor tendinopathy versus inguinal hernia.  Less likely that this is due to athletic pubalgia or labral/NERI mediated.      Examination is notable for significant tenderness along the left iliopsoas tendon.  He has slight weakness compared to contralateral side with resisted hip flexion with concomitant knee extension.       We proceeded with an ultrasound-guided hip flexor injection for both diagnostic and therapeutic reasons.  He felt better afterwards.  He should continue to avoid strenuous movements for now.  We will have him start PT.  He recently had an oral steroid burst which also helped a lot with the pain.  If he had continued pain afterwards, would consider consultation with general surgery versus diagnostic intra-articular hip injection.     Imaging Studies (I personally reviewed images in PACS and report):  CT pelvis with contrast from 12/19/2024 reviewed.  These images show loss of femoral head and neck sphericity consistent with cam type NERI and a small osteophyte just inferior to the lateral acetabular margin.      No follow-ups on file.    Patient is in agreement with the above plan.    HPI:  Jose Alberto Penaloza is a 54 y.o. male  who presents in follow up.  Here for   Chief Complaint   Patient presents with    Left Hip - Pain, Injections       Since last visit: See above.    Following history reviewed and updated:  Past Medical History:   Diagnosis Date    Allergic     Allergic rhinitis     Anxiety     Arthritis     Colon polyp     CPAP (continuous positive airway pressure) dependence     CURRENTLY NOT USING DUE TO RECALL    Depression     Seasonal allergies     Sleep apnea      Past Surgical History:   Procedure Laterality Date     "APPENDECTOMY  2014    COLONOSCOPY      EGD      HERNIA REPAIR      2015 & 2016    ULNAR NERVE TRANSPOSITION Right 2023    WISDOM TOOTH EXTRACTION       Social History   Social History     Substance and Sexual Activity   Alcohol Use Not Currently     Social History     Substance and Sexual Activity   Drug Use Never     Social History     Tobacco Use   Smoking Status Former    Current packs/day: 0.00    Types: Cigars, Cigarettes    Quit date: 10/1/2019    Years since quittin.3   Smokeless Tobacco Never   Tobacco Comments    on occasion     Family History   Problem Relation Age of Onset    Cancer Mother     Vision loss Mother     Anxiety disorder Mother     Depression Father     Diabetes Father     Cancer Sister     Anxiety disorder Sister      No Known Allergies     Constitutional:  Ht 5' 10\" (1.778 m)   Wt 84.8 kg (187 lb)   BMI 26.83 kg/m²    General: NAD.  Eyes: Clear sclerae.  ENT: No inflammation, lesion, or mass of lips.  No tracheal deviation.  Musculoskeletal: As mentioned below.  Integumentary: No visible rashes or skin lesions.  Pulmonary/Chest: Effort normal. No respiratory distress.   Neuro: CN's grossly intact, COX.  Psych: Normal affect and judgement.  Vascular: WWP.    Left Hip Exam     Tenderness   The patient is experiencing tenderness in the anterior.    Other   Erythema: absent  Scars: absent  Sensation: normal  Pulse: present    Comments:  Positive Stinchfield test.             Large joint arthrocentesis: L iliopsoas bursa  Universal Protocol:  Procedure performed by:  Consent: Verbal consent obtained. Written consent not obtained.  Consent given by: patient  Timeout called at: 2025 11:16 AM.  Patient understanding: patient states understanding of the procedure being performed  Site marked: the operative site was marked  Radiology Images displayed and confirmed. If images not available, report reviewed: imaging studies available  Patient identity confirmed: verbally with " patient  Supporting Documentation  Indications: pain and diagnostic evaluation   Procedure Details  Location: hip - L iliopsoas bursa  Ultrasound guidance: yes (US guidance was used to find the area of interest.)  Medications administered: 80 mg triamcinolone acetonide 40 mg/mL; 10 mL ropivacaine 0.5 %    Patient tolerance: patient tolerated the procedure well with no immediate complications  Dressing:  Sterile dressing applied    The left iliopsoas bursa was visualized with ultrasound and injected with steroid/anesthetic solution as indicated.    Prior to the injection, the ultrasound was used to evaluate for any neural or vascular structures.  Care was taken to avoid these structures.    The images (and video if taken) were saved to the [employer] ultrasound system.    Risks of this procedure include:    - Risk of bleeding since a needle is involved.  - Risk of infection (1/10,000 chance as per recent studies).  Signs/symptoms were discussed and they would prompt an urgent evaluation at an emergency department.  - Risk of pigmentation or skin dimpling in the skin (2-3% chance as per recent studies) from the steroid.  - Risk of increased pain from steroid flare (1% chance as per recent studies) that typically lasts 24-48 hours.  - Risk of increased blood sugars from the steroid medication that can last for a few weeks.  If the patient is a diabetic or pre-diabetic, they were encouraged to closely monitor their blood sugars and discuss with PCP if elevated more than usual or if having symptoms.    We decided that the benefits outweigh the risks and so we proceeded with the procedure.

## 2025-01-23 ENCOUNTER — EVALUATION (OUTPATIENT)
Dept: PHYSICAL THERAPY | Facility: CLINIC | Age: 55
End: 2025-01-23
Payer: COMMERCIAL

## 2025-01-23 DIAGNOSIS — M24.552 HIP FLEXOR TENDON TIGHTNESS, LEFT: ICD-10-CM

## 2025-01-23 DIAGNOSIS — R10.32 LLQ PAIN: Primary | ICD-10-CM

## 2025-01-23 PROCEDURE — 97162 PT EVAL MOD COMPLEX 30 MIN: CPT

## 2025-01-23 NOTE — PROGRESS NOTES
PT Evaluation     Today's date: 2025  Patient name: Jose Alberto Penaloza  : 1970  MRN: [id number]  Referring provider: Edwin Rain DO Dx:   Encounter Diagnosis     ICD-10-CM    1. LLQ pain  R10.32 Ambulatory referral to Physical Therapy      2. Hip flexor tendon tightness, left  M24.552                      Assessment  Impairments: abnormal muscle tone, abnormal or restricted ROM, activity intolerance, impaired physical strength, lacks appropriate home exercise program and pain with function  Symptom irritability: moderate    Assessment details: Jose Alberto Penaloza is a pleasant 54 y.o. male who presents with L hip pain.  The patient's greatest concerns are worry over not knowing what's wrong, concern at no signs of improvement, fear of not being able to keep active, and future ill health (and wanting to prevent it).      No further referral appears necessary at this time based upon examination results.    Primary movement impairment diagnosis of decreased strength resulting in pathoanatomical symptoms of decreased motion and limiting his ability to exercise or recreation, lift, perform household chores, and perform yard work.    Primary Impairments:  1) decreased strength  2) decreased hip flexor flexibility    Etiologic factors include none recalled by the patient.    Understanding of Dx/Px/POC: good     Prognosis: good  Prognosis details: Positive prognostic indicators include positive attitude toward recovery and good understanding of diagnosis and treatment plan options.  Negative prognostic indicators include chronicity of symptoms, high symptom irritability, and multiple concurrent orthopedic problems.      Goals  Impairment Goals 4-6 weeks  - Decrease pain to <2/10  - Improve lumbar AROM to >100% throughout  - Increase hip strength to 5/5 throughout  - Increase core strength to be able to sit straight up without deviation    Functional Goals 6-8 weeks  - Return to Prior Level of Function  -  Patient will be independent with HEP  - Patient will be able to sit without increased pain/compensation/difficulty  - Patient will be able to perform sit to stand without increased pain/compensation/difficulty   - Patient will be able to bend forward without increased pain/compensation/difficulty   - Patient will be able to lift >30 pounds with proper mechanics and no pain      Plan  Patient would benefit from: skilled physical therapy  Planned modality interventions: Modalities PRN.    Planned therapy interventions: activity modification, manual therapy, neuromuscular re-education, patient education, therapeutic activities, therapeutic exercise, graded activity, home exercise program, behavior modification, self care, IASTM and joint mobilization    Frequency: 2x week  Duration in weeks: 8  Treatment plan discussed with: patient        Subjective Evaluation    History of Present Illness  Mechanism of injury: WORK/SCHOOL: retired,   HOME LIFE: dog, steps to basement  HOBBIES/EXERCISE: yoga, strength training, woodworking, music, art, cinema, museums  PLOF:  no prior low back injuries, prior hernia  HISTORY OF CURRENT INJURY:  Pt reports that he has had some anterior hip pain for a while, believing it was another hernia.  He went to his physician to get hi hernia checked and was found that it was more hip flexor  PAIN LOCATION/DESCRIPTORS: anterior hip  AGGRAVATING FACTORS:  no current aggravation since prednisone  EASES: --  DAY PATTERN: --  IMAGING:  CT scan, ultrasound  Jose Alberto denies a new onset of Bladder incontinence, Bowel dysfunction, Dizziness, Dysphagia, Dysarthria, Drop attacks, Diplopia, Nausea, Ataxia, Recent unexplained weight loss, Clumsy or unsteadiness, Constant night pain, History of cancer, Tingling, Numbness, and Saddle anesthesia .  PATIENT GOALS: decrease back pain to the best he can, learn how to avoid this happening again.       Patient Goals  Patient goals for therapy: decreased pain, return  to sport/leisure activities and increased strength    Pain  Current pain ratin  At best pain ratin  At worst pain ratin  Quality: sharp  Relieving factors: relaxation, rest and medications          Objective     Palpation     Additional Palpation Details  TTP, iliopsoas, proximal quad    Lumbar Screen  Lumbar range of motion within normal limits.    Neurological Testing     Additional Neurological Details  No N/T or other neurological changes to note.     Active Range of Motion     Additional Active Range of Motion Details  Hip motions WFL    Joint Play   Left Hip   Joints within functional limits are the posterior hip capsule, anterior hip capsule, lateral hip capsule and long axis distraction.     Right Hip   Joints within functional limits are the posterior hip capsule, anterior hip capsule, long axis distraction and long axis distraction.     Strength/Myotome Testing     Left Hip   Planes of Motion   Flexion: 4-  Extension: 4-  Abduction: 3+    Right Hip   Planes of Motion   Flexion: 4  Extension: 4  Abduction: 4    Tests     Left Hip   Brian: Positive.   Modified Brian: Positive.     Right Hip   Brian: Negative.    Modified Brian: Negative.                POC Expires    Auth Status    Unit limit    Expiration date    PT/OT Limit        Diagnosis:  L hip flexor   Precautions:     Comparable signs    Primary Impairments:    Patient Goals    Date        Used        Remaining        Manual Therapy                                                                  Exercise Diary              Therapeutic Exercise                                                                                Bike             Neuromuscular Re-education                                                                                           Re-Eval             Therapeutic Activities             Education                                                                    Modalities

## 2025-01-30 ENCOUNTER — OFFICE VISIT (OUTPATIENT)
Dept: FAMILY MEDICINE CLINIC | Facility: CLINIC | Age: 55
End: 2025-01-30

## 2025-01-30 VITALS
BODY MASS INDEX: 26.77 KG/M2 | HEART RATE: 109 BPM | DIASTOLIC BLOOD PRESSURE: 80 MMHG | RESPIRATION RATE: 16 BRPM | TEMPERATURE: 98.4 F | HEIGHT: 70 IN | WEIGHT: 187 LBS | OXYGEN SATURATION: 95 % | SYSTOLIC BLOOD PRESSURE: 120 MMHG

## 2025-01-30 DIAGNOSIS — J20.9 ACUTE BRONCHITIS, UNSPECIFIED ORGANISM: Primary | ICD-10-CM

## 2025-01-30 DIAGNOSIS — F31.89 OTHER BIPOLAR DISORDER (HCC): ICD-10-CM

## 2025-01-30 RX ORDER — AZITHROMYCIN 250 MG/1
TABLET, FILM COATED ORAL
Qty: 6 TABLET | Refills: 0 | Status: SHIPPED | OUTPATIENT
Start: 2025-01-30 | End: 2025-02-04

## 2025-01-30 RX ORDER — ALBUTEROL SULFATE 90 UG/1
2 INHALANT RESPIRATORY (INHALATION) EVERY 6 HOURS PRN
Qty: 18 G | Refills: 0 | Status: SHIPPED | OUTPATIENT
Start: 2025-01-30

## 2025-01-30 NOTE — PROGRESS NOTES
Name: Jose Alberto Penaloza      : 1970      MRN: [id number]  Encounter Provider: Fawn Watts MD  Encounter Date: 2025   Encounter department: [facility]    Assessment & Plan  Acute bronchitis, unspecified organism  Wheezing in the chest, start Zithromax and inhaler, continue Mucinex DM for cough and congestion drink plenty of fluid advised not to go to work today and tomorrow  Orders:  •  azithromycin (Zithromax) 250 mg tablet; Take 2 tablets (500 mg total) by mouth daily for 1 day, THEN 1 tablet (250 mg total) daily for 4 days.  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  Negative for appetite change, chills, fatigue and fever.   HENT:  Positive for congestion.    Eyes: Negative.  Negative for itching.   Respiratory:  Positive for cough.    Gastrointestinal: Negative.    Musculoskeletal: Negative.      Past Medical History:   Diagnosis Date   • Allergic    • Allergic rhinitis    • Anxiety    • Arthritis    • Colon polyp    • CPAP (continuous positive airway pressure) dependence     CURRENTLY NOT USING DUE TO RECALL   • Depression    • Seasonal allergies    • Sleep apnea      Past Surgical History:   Procedure Laterality Date   • APPENDECTOMY     • COLONOSCOPY     • EGD     • HERNIA REPAIR       & 2016   • ULNAR NERVE TRANSPOSITION Right 2023   • WISDOM TOOTH EXTRACTION       Family  History   Problem Relation Age of Onset   • Cancer Mother    • Vision loss Mother    • Anxiety disorder Mother    • Depression Father    • Diabetes Father    • Cancer Sister    • Anxiety disorder Sister      Social History     Tobacco Use   • Smoking status: Former     Current packs/day: 0.00     Types: Cigars, Cigarettes     Quit date: 10/1/2019     Years since quittin.3   • Smokeless tobacco: Never   • Tobacco comments:     on occasion   Vaping Use   • Vaping status: Former   Substance and Sexual Activity   • Alcohol use: Not Currently   • Drug use: Never   • Sexual activity: Yes     Partners: Female     Birth control/protection: None     Current Outpatient Medications on File Prior to Visit   Medication Sig   • buPROPion (WELLBUTRIN SR) 100 mg 12 hr tablet TAKE 1 TAB IN AM WITH FOOD   • CANNABIDIOL PO cannabidiol (CBD) extract   • escitalopram (LEXAPRO) 20 mg tablet Take 20 mg by mouth daily   • polyethylene glycol (GOLYTELY) 4000 mL solution Take 4,000 mL by mouth once for 1 dose   • [DISCONTINUED] cefuroxime (CEFTIN) 500 mg tablet Take 1 tablet by mouth 2 (two) times a day (Patient not taking: Reported on 2024)   • [DISCONTINUED] fluocinolone (SYNALAR) 0.025 % cream APPLY TOPICALLY TO EYELIDS & HANDS 2X/DAY AS DIRECTED   • [DISCONTINUED] fluticasone (FLONASE) 50 mcg/act nasal spray SPRAY 2 SPRAYS INTO EACH NOSTRIL EVERY DAY   • [DISCONTINUED] ketoconazole (NIZORAL) 2 % cream APPLY TOPICALLY 2X/DAY TO GROIN AREA AS DIRECTED   • [DISCONTINUED] meloxicam (MOBIC) 15 mg tablet TAKE 1 TABLET (15 MG TOTAL) BY MOUTH DAILY.   • [DISCONTINUED] nystatin (MYCOSTATIN) cream Apply topically 2 (two) times a day for 14 days   • [DISCONTINUED] zolpidem (AMBIEN) 10 mg tablet Take 0.5-1 tablets by mouth daily at bedtime as needed     No Known Allergies  Immunization History   Administered Date(s) Administered   • COVID-19 MODERNA VACC 0.5 ML IM 2021, 2021, 2021   • COVID-19 Moderna Vac BIVALENT 12 Yr+  "IM 0.5 ML 01/17/2023   • INFLUENZA 10/22/2020, 10/01/2021, 10/25/2021, 11/14/2022   • Influenza Recombinant Preservative Free Im 09/25/2024   • MMR 01/14/2025   • Tdap 01/14/2025   • Zoster Vaccine Recombinant 10/29/2021, 05/18/2022     Objective   /80   Pulse (!) 109   Temp 98.4 °F (36.9 °C)   Resp 16   Ht 5' 10\" (1.778 m)   Wt 84.8 kg (187 lb)   SpO2 95%   BMI 26.83 kg/m²     Physical Exam  Vitals and nursing note reviewed.   Constitutional:       Appearance: He is well-developed.   HENT:      Head: Atraumatic.      Right Ear: Tympanic membrane normal.      Left Ear: Tympanic membrane normal.      Mouth/Throat:      Tonsils: No tonsillar exudate.   Cardiovascular:      Rate and Rhythm: Regular rhythm.      Heart sounds: No murmur heard.  Pulmonary:      Effort: Pulmonary effort is normal.      Breath sounds: Wheezing and rhonchi present.   Musculoskeletal:      Cervical back: Normal range of motion.         "

## 2025-01-30 NOTE — ASSESSMENT & PLAN NOTE
Wheezing in the chest, start Zithromax and inhaler, continue Mucinex DM for cough and congestion drink plenty of fluid advised not to go to work today and tomorrow  Orders:  •  azithromycin (Zithromax) 250 mg tablet; Take 2 tablets (500 mg total) by mouth daily for 1 day, THEN 1 tablet (250 mg total) daily for 4 days.  •  albuterol (Ventolin HFA) 90 mcg/act inhaler; Inhale 2 puffs every 6 (six) hours as needed for wheezing

## 2025-02-05 ENCOUNTER — OFFICE VISIT (OUTPATIENT)
Dept: PHYSICAL THERAPY | Facility: CLINIC | Age: 55
End: 2025-02-05
Payer: COMMERCIAL

## 2025-02-05 DIAGNOSIS — M24.552 HIP FLEXOR TENDON TIGHTNESS, LEFT: Primary | ICD-10-CM

## 2025-02-05 PROCEDURE — 97112 NEUROMUSCULAR REEDUCATION: CPT

## 2025-02-05 PROCEDURE — 97140 MANUAL THERAPY 1/> REGIONS: CPT

## 2025-02-05 PROCEDURE — 97110 THERAPEUTIC EXERCISES: CPT

## 2025-02-05 NOTE — PROGRESS NOTES
"Daily Note     Today's date: 2025  Patient name: Jose Alberto Penaloza  : 1970  MRN: [id number]  Referring provider: Edwin Rain DO  Dx:   Encounter Diagnosis     ICD-10-CM    1. Hip flexor tendon tightness, left  M24.552                      Subjective: Pt reports that he was sick for the last few days and hasn't been able to work out because of that.       Objective: See treatment diary below      Assessment: Tolerated treatment well. Began today with gentle warm up on bike prior to manual interventions.  Pt had some increased soreness with iliopsoas TPR today though some decrease in tightness noted. Trailed brian stretch for hip flexor stretching though pt reported most stretch within quad.  Patient demonstrated fatigue post treatment, exhibited good technique with therapeutic exercises, and would benefit from continued PT      Plan: Continue per plan of care.        POC Expires    Auth Status    Unit limit    Expiration date    PT/OT Limit        Diagnosis:  L hip flexor   Precautions:     Comparable signs    Primary Impairments:    Patient Goals    Date        Used        Remaining        Manual Therapy           TPR  AK hip flexor, iliopsoas         Lumbar mobs  AK                                          Exercise Diary              Therapeutic Exercise             LTR  x20         Brian stretch  3x10\"         Hip flexor stretch  10x10\", kneeling                                           Bike    5'         Neuromuscular Re-education             SLR  x10         AB  2x10 ea                                                                 Re-Eval             Therapeutic Activities             Education                                                                    Modalities                                   "

## 2025-02-12 ENCOUNTER — OFFICE VISIT (OUTPATIENT)
Dept: PHYSICAL THERAPY | Facility: CLINIC | Age: 55
End: 2025-02-12
Payer: COMMERCIAL

## 2025-02-12 DIAGNOSIS — R10.32 LLQ PAIN: ICD-10-CM

## 2025-02-12 DIAGNOSIS — M24.552 HIP FLEXOR TENDON TIGHTNESS, LEFT: Primary | ICD-10-CM

## 2025-02-12 PROCEDURE — 97110 THERAPEUTIC EXERCISES: CPT

## 2025-02-12 PROCEDURE — 97112 NEUROMUSCULAR REEDUCATION: CPT

## 2025-02-12 NOTE — PROGRESS NOTES
"Daily Note     Today's date: 2025  Patient name: Jose Alberto Penaloza  : 1970  MRN: [id number]  Referring provider: Edwin Rain DO  Dx:   Encounter Diagnosis     ICD-10-CM    1. Hip flexor tendon tightness, left  M24.552       2. LLQ pain  R10.32           Start Time: 0800  Stop Time: 0845  Total time in clinic (min): 45 minutes    Subjective: Pt reports that he hasn't had much pain during his daily activities, though reports some pain when coughing.        Objective: See treatment diary below      Assessment: Tolerated treatment well. Began with warm up on bike prior to stretching for hip flexor.  Added some strengthening for core within deep core as well as hip flexors. He had some challenge with addition of pelvic tilts with different leg activities to ensure proper support prior to more challenging activities.  Added adductor stretch as well with decreased symptoms noted afterward.  Patient demonstrated fatigue post treatment, exhibited good technique with therapeutic exercises, and would benefit from continued PT      Plan: Continue per plan of care.        POC Expires    Auth Status    Unit limit    Expiration date    PT/OT Limit        Diagnosis:  L hip flexor   Precautions:     Comparable signs    Primary Impairments:    Patient Goals    Date       Used        Remaining        Manual Therapy           TPR  AK hip flexor, iliopsoas         Lumbar mobs  AK                                          Exercise Diary              Therapeutic Exercise             LTR  x20         Brian stretch  3x10\"         Hip flexor stretch  10x10\", kneeling  10x10\" chair       Butterfly stretch    5x20\"                              Bike    5'  5'       Neuromuscular Re-education             SLR  x10  2x10 c pelvic tilt       AB  2x10 ea         Dead bugs    2x10       Bird dogs    2x10       Hip flexor rainbows    2x10 ea       Supine hip flexor marches    2x10 RTB                   Re-Eval           "   Therapeutic Activities             Education                                                                    Modalities

## 2025-02-14 ENCOUNTER — OFFICE VISIT (OUTPATIENT)
Dept: OBGYN CLINIC | Facility: CLINIC | Age: 55
End: 2025-02-14
Payer: COMMERCIAL

## 2025-02-14 VITALS — WEIGHT: 187 LBS | HEIGHT: 70 IN | BODY MASS INDEX: 26.77 KG/M2

## 2025-02-14 DIAGNOSIS — R10.32 LLQ PAIN: Primary | ICD-10-CM

## 2025-02-14 PROCEDURE — 99213 OFFICE O/P EST LOW 20 MIN: CPT | Performed by: PHYSICAL MEDICINE & REHABILITATION

## 2025-02-14 NOTE — PROGRESS NOTES
1. LLQ pain          No orders of the defined types were placed in this encounter.       Impression:  Patient is here in follow up of lower quadrant pain likely secondary to hip flexor tendinopathy versus inguinal hernia.  Less likely that this is due to athletic pubalgia or labral/NERI mediated.      Treatment has included USG hip flexor injection, oral steroids, PT/HEP.  He is doing very well.  Continue PT/HEP.  I will see him back if needed.    Imaging Studies (I personally reviewed images in PACS and report):  CT pelvis with contrast from 2024 reviewed.  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    Patient is in agreement with the above plan.    HPI:  Jose Alberto Penaloza is a 54 y.o. male  who presents in follow up.  Here for   Chief Complaint   Patient presents with    Left Hip - Pain, Injections, Follow-up       Since last visit: See above.    Following history reviewed and updated:  Past Medical History:   Diagnosis Date    Allergic     Allergic rhinitis     Anxiety     Arthritis     Colon polyp     CPAP (continuous positive airway pressure) dependence     CURRENTLY NOT USING DUE TO RECALL    Depression     Seasonal allergies     Sleep apnea      Past Surgical History:   Procedure Laterality Date    APPENDECTOMY      COLONOSCOPY      EGD      HERNIA REPAIR       & 2016    ULNAR NERVE TRANSPOSITION Right 2023    WISDOM TOOTH EXTRACTION       Social History   Social History     Substance and Sexual Activity   Alcohol Use Not Currently     Social History     Substance and Sexual Activity   Drug Use Never     Social History     Tobacco Use   Smoking Status Former    Current packs/day: 0.00    Types: Cigars, Cigarettes    Quit date: 10/1/2019    Years since quittin.3   Smokeless Tobacco Never   Tobacco Comments    on occasion     Family History   Problem  "Relation Age of Onset    Cancer Mother     Vision loss Mother     Anxiety disorder Mother     Depression Father     Diabetes Father     Cancer Sister     Anxiety disorder Sister      No Known Allergies     Constitutional:  Ht 5' 10\" (1.778 m)   Wt 84.8 kg (187 lb)   BMI 26.83 kg/m²    General: NAD.  Eyes: Clear sclerae.  ENT: No inflammation, lesion, or mass of lips.  No tracheal deviation.  Musculoskeletal: As mentioned below.  Integumentary: No visible rashes or skin lesions.  Pulmonary/Chest: Effort normal. No respiratory distress.   Neuro: CN's grossly intact, COX.  Psych: Normal affect and judgement.  Vascular: WWP.    Left Hip Exam   Left hip exam is normal.    Tenderness   The patient is experiencing no tenderness.     Range of Motion   The patient has normal left hip ROM.    Muscle Strength   The patient has normal left hip strength.     Other   Erythema: absent  Scars: absent  Sensation: normal  Pulse: present             Procedures  "

## 2025-02-19 ENCOUNTER — OFFICE VISIT (OUTPATIENT)
Dept: PHYSICAL THERAPY | Facility: CLINIC | Age: 55
End: 2025-02-19
Payer: COMMERCIAL

## 2025-02-19 DIAGNOSIS — R10.32 LLQ PAIN: ICD-10-CM

## 2025-02-19 DIAGNOSIS — M24.552 HIP FLEXOR TENDON TIGHTNESS, LEFT: Primary | ICD-10-CM

## 2025-02-19 PROCEDURE — 97112 NEUROMUSCULAR REEDUCATION: CPT

## 2025-02-19 PROCEDURE — 97110 THERAPEUTIC EXERCISES: CPT

## 2025-02-19 NOTE — PROGRESS NOTES
"Daily Note     Today's date: 2025  Patient name: Jose Alberto Penaloza  : 1970  MRN: [id number]  Referring provider: Edwin Rain DO  Dx:   Encounter Diagnosis     ICD-10-CM    1. Hip flexor tendon tightness, left  M24.552       2. LLQ pain  R10.32           Start Time: 0800  Stop Time: 0845  Total time in clinic (min): 45 minutes    Subjective: Pt reports that he was able to do yoga over the weekend with a little discomfort, and was able to participate yesterday in a class and is feeling great thi morning.        Objective: See treatment diary below      Assessment: Tolerated treatment well. Began today with warm up on bike following up with some stretching.  Pt had little to no symptoms today so worked hip flexors in multiple ways. Pt had fatigue though hip flexors today though no symptoms noted.  Patient demonstrated fatigue post treatment, exhibited good technique with therapeutic exercises, and would benefit from continued PT      Plan: Continue per plan of care.        POC Expires    Auth Status    Unit limit    Expiration date    PT/OT Limit        Diagnosis:  L hip flexor   Precautions:     Comparable signs    Primary Impairments:    Patient Goals    Date      Used        Remaining        Manual Therapy           TPR  AK hip flexor, iliopsoas         Lumbar mobs  AK                                          Exercise Diary              Therapeutic Exercise             LTR  x20    x20     Brian stretch  3x10\"    10x10\"     Hip flexor stretch  10x10\", kneeling  10x10\" chair  10x10\"     Butterfly stretch    5x20\"                              Bike    5'  5'  5'     Neuromuscular Re-education             SLR  x10  2x10 c pelvic tilt       AB  2x10 ea         Dead bugs    2x10       Bird dogs    2x10       Hip flexor rainbows    2x10 ea  1x10. X20 quick     Supine hip flexor marches    2x10 RTB  2x10 supine     S. Nerve glides    2x10                Re-Eval             Therapeutic Activities "             Education                                                                    Modalities

## 2025-02-21 ENCOUNTER — TELEPHONE (OUTPATIENT)
Age: 55
End: 2025-02-21

## 2025-02-21 NOTE — TELEPHONE ENCOUNTER
Patients GI provider:  Dr. Yan    Number to return call: ( [phone]     Reason for call: Pt calling to r/s colonoscopy to another location.  R/s for March 17th, Dr Yan [facility].     Scheduled procedure/appointment date if applicable: procedure 3/17/25

## 2025-02-26 ENCOUNTER — OFFICE VISIT (OUTPATIENT)
Dept: PHYSICAL THERAPY | Facility: CLINIC | Age: 55
End: 2025-02-26
Payer: COMMERCIAL

## 2025-02-26 DIAGNOSIS — R10.32 LLQ PAIN: ICD-10-CM

## 2025-02-26 DIAGNOSIS — M24.552 HIP FLEXOR TENDON TIGHTNESS, LEFT: Primary | ICD-10-CM

## 2025-02-26 PROCEDURE — 97110 THERAPEUTIC EXERCISES: CPT

## 2025-02-26 PROCEDURE — 97140 MANUAL THERAPY 1/> REGIONS: CPT

## 2025-02-26 NOTE — PROGRESS NOTES
"Daily Note     Today's date: 2025  Patient name: Jose Alberto Penaloza  : 1970  MRN: [id number]  Referring provider: Edwin Rain DO  Dx:   Encounter Diagnosis     ICD-10-CM    1. Hip flexor tendon tightness, left  M24.552       2. LLQ pain  R10.32           Start Time: 0800  Stop Time: 0845  Total time in clinic (min): 45 minutes    Subjective: Pt reports that he is feeling a little more stiff today, not having slept great.        Objective: See treatment diary below      Assessment: Andrade has been compliant with attending physical therapy and completing home exercise program since initial evaluation.  Andrade has made improvements in objective data since initial evaluation and has achieved all goals.  Patient reports having returned to their prior level of function. Patient provided with updated Home Exercise Program, all questions answered, and verbalized understanding, agreeing to plan of care. Thus it was mutually decided to discontinue this episode of care and transition to Home Exercise Program.         Plan: Continue per plan of care.        POC Expires    Auth Status    Unit limit    Expiration date    PT/OT Limit        Diagnosis:  L hip flexor   Precautions:     Comparable signs    Primary Impairments:    Patient Goals    Date     Used        Remaining        Manual Therapy           TPR  AK hip flexor, iliopsoas      AK   Lumbar mobs  AK      AK   Thoracic mobs        AK                         Exercise Diary              Therapeutic Exercise             LTR  x20    x20  x20   Brian stretch  3x10\"    10x10\"  10x10\"   Hip flexor stretch  10x10\", kneeling  10x10\" chair  10x10\"  10x10\"   Butterfly stretch    5x20\"                              Bike    5'  5'  5'     Neuromuscular Re-education             SLR  x10  2x10 c pelvic tilt       AB  2x10 ea         Dead bugs    2x10       Bird dogs    2x10       Hip flexor rainbows    2x10 ea  1x10. X20 quick     Supine hip flexor marches    " 2x10 RTB  2x10 supine     S. Nerve glides    2x10                Re-Eval             Therapeutic Activities             Education                                                                    Modalities

## 2025-03-03 ENCOUNTER — ANESTHESIA EVENT (OUTPATIENT)
Dept: ANESTHESIOLOGY | Facility: HOSPITAL | Age: 55
End: 2025-03-03

## 2025-03-03 ENCOUNTER — ANESTHESIA (OUTPATIENT)
Dept: ANESTHESIOLOGY | Facility: HOSPITAL | Age: 55
End: 2025-03-03

## 2025-03-14 RX ORDER — SODIUM CHLORIDE, SODIUM LACTATE, POTASSIUM CHLORIDE, CALCIUM CHLORIDE 600; 310; 30; 20 MG/100ML; MG/100ML; MG/100ML; MG/100ML
125 INJECTION, SOLUTION INTRAVENOUS CONTINUOUS
Status: CANCELLED | OUTPATIENT
Start: 2025-03-14

## 2025-03-17 ENCOUNTER — ANESTHESIA EVENT (OUTPATIENT)
Dept: GASTROENTEROLOGY | Facility: AMBULARY SURGERY CENTER | Age: 55
End: 2025-03-17
Payer: COMMERCIAL

## 2025-03-17 ENCOUNTER — HOSPITAL ENCOUNTER (OUTPATIENT)
Dept: GASTROENTEROLOGY | Facility: AMBULARY SURGERY CENTER | Age: 55
Setting detail: OUTPATIENT SURGERY
Discharge: HOME | End: 2025-03-17
Attending: INTERNAL MEDICINE
Payer: COMMERCIAL

## 2025-03-17 VITALS
OXYGEN SATURATION: 96 % | HEIGHT: 70 IN | HEART RATE: 75 BPM | BODY MASS INDEX: 26.76 KG/M2 | DIASTOLIC BLOOD PRESSURE: 78 MMHG | RESPIRATION RATE: 18 BRPM | WEIGHT: 186.95 LBS | TEMPERATURE: 98.2 F | SYSTOLIC BLOOD PRESSURE: 125 MMHG

## 2025-03-17 DIAGNOSIS — Z86.0100 HX OF COLONIC POLYPS: ICD-10-CM

## 2025-03-17 DIAGNOSIS — K52.9 COLITIS: ICD-10-CM

## 2025-03-17 PROCEDURE — 88305 TISSUE EXAM BY PATHOLOGIST: CPT | Performed by: PATHOLOGY

## 2025-03-17 RX ORDER — PROPOFOL 10 MG/ML
INJECTION, EMULSION INTRAVENOUS AS NEEDED
Status: DISCONTINUED | OUTPATIENT
Start: 2025-03-17 | End: 2025-03-17

## 2025-03-17 RX ORDER — SODIUM CHLORIDE, SODIUM LACTATE, POTASSIUM CHLORIDE, CALCIUM CHLORIDE 600; 310; 30; 20 MG/100ML; MG/100ML; MG/100ML; MG/100ML
125 INJECTION, SOLUTION INTRAVENOUS CONTINUOUS
Status: DISCONTINUED | OUTPATIENT
Start: 2025-03-17 | End: 2025-03-21 | Stop reason: HOSPADM

## 2025-03-17 RX ORDER — LIDOCAINE HYDROCHLORIDE 10 MG/ML
INJECTION, SOLUTION EPIDURAL; INFILTRATION; INTRACAUDAL; PERINEURAL AS NEEDED
Status: DISCONTINUED | OUTPATIENT
Start: 2025-03-17 | End: 2025-03-17

## 2025-03-17 RX ORDER — ONDANSETRON 2 MG/ML
4 INJECTION INTRAMUSCULAR; INTRAVENOUS ONCE AS NEEDED
Status: DISCONTINUED | OUTPATIENT
Start: 2025-03-17 | End: 2025-03-21 | Stop reason: HOSPADM

## 2025-03-17 RX ORDER — SODIUM CHLORIDE, SODIUM LACTATE, POTASSIUM CHLORIDE, CALCIUM CHLORIDE 600; 310; 30; 20 MG/100ML; MG/100ML; MG/100ML; MG/100ML
INJECTION, SOLUTION INTRAVENOUS CONTINUOUS PRN
Status: DISCONTINUED | OUTPATIENT
Start: 2025-03-17 | End: 2025-03-17

## 2025-03-17 RX ADMIN — LIDOCAINE HYDROCHLORIDE 50 MG: 10 INJECTION, SOLUTION EPIDURAL; INFILTRATION; INTRACAUDAL; PERINEURAL at 08:13

## 2025-03-17 RX ADMIN — PROPOFOL 50 MG: 10 INJECTION, EMULSION INTRAVENOUS at 08:17

## 2025-03-17 RX ADMIN — PROPOFOL 20 MG: 10 INJECTION, EMULSION INTRAVENOUS at 08:20

## 2025-03-17 RX ADMIN — PROPOFOL 20 MG: 10 INJECTION, EMULSION INTRAVENOUS at 08:19

## 2025-03-17 RX ADMIN — PROPOFOL 50 MG: 10 INJECTION, EMULSION INTRAVENOUS at 08:15

## 2025-03-17 RX ADMIN — SODIUM CHLORIDE, SODIUM LACTATE, POTASSIUM CHLORIDE, AND CALCIUM CHLORIDE 125 ML/HR: .6; .31; .03; .02 INJECTION, SOLUTION INTRAVENOUS at 07:23

## 2025-03-17 RX ADMIN — PROPOFOL 20 MG: 10 INJECTION, EMULSION INTRAVENOUS at 08:23

## 2025-03-17 RX ADMIN — PROPOFOL 20 MG: 10 INJECTION, EMULSION INTRAVENOUS at 08:24

## 2025-03-17 RX ADMIN — SODIUM CHLORIDE, SODIUM LACTATE, POTASSIUM CHLORIDE, AND CALCIUM CHLORIDE: .6; .31; .03; .02 INJECTION, SOLUTION INTRAVENOUS at 07:53

## 2025-03-17 RX ADMIN — PROPOFOL 100 MG: 10 INJECTION, EMULSION INTRAVENOUS at 08:13

## 2025-03-17 RX ADMIN — PROPOFOL 20 MG: 10 INJECTION, EMULSION INTRAVENOUS at 08:22

## 2025-03-17 NOTE — ANESTHESIA PREPROCEDURE EVALUATION
Procedure:  COLONOSCOPY    Relevant Problems   ANESTHESIA (within normal limits)      CARDIO (within normal limits)      ENDO (within normal limits)      NEURO/PSYCH   (+) Depression      PULMONARY   (+) Obstructive sleep apnea        Physical Exam    Airway    Mallampati score: II  TM Distance: >3 FB  Neck ROM: full     Dental   No notable dental hx     Cardiovascular  Rhythm: regular, Rate: normal    Pulmonary   Breath sounds clear to auscultation    Other Findings        Anesthesia Plan  ASA Score- 2     Anesthesia Type- IV sedation with anesthesia with ASA Monitors.         Additional Monitors:     Airway Plan:            Plan Factors-Exercise tolerance (METS): >4 METS.    Chart reviewed.    Patient summary reviewed.    Patient is not a current smoker.              Induction-     Postoperative Plan-         Informed Consent- Anesthetic plan and risks discussed with patient.  I personally reviewed this patient with the CRNA. Discussed and agreed on the Anesthesia Plan with the CRNA..      NPO Status:  Vitals Value Taken Time   Date of last liquid 03/17/25 03/17/25 0711   Time of last liquid 0530 03/17/25 0711   Date of last solid 03/15/25 03/17/25 0711   Time of last solid 1530 03/17/25 0711

## 2025-03-17 NOTE — ANESTHESIA POSTPROCEDURE EVALUATION
Post-Op Assessment Note    CV Status:  Stable  Pain Score: 0    Pain management: adequate       Mental Status:  Awake and sleepy   Hydration Status:  Stable   PONV Controlled:  None   Airway Patency:  Patent  Two or more mitigation strategies used for obstructive sleep apnea   Post Op Vitals Reviewed: Yes    No anethesia notable event occurred.    Staff: CRNA   Comments: spontaneously breathing, protecting airway, ventilating, vss, fully endorsed to recovery w/o AC          Last Filed PACU Vitals:  Vitals Value Taken Time   Temp     Pulse 72    /54    Resp 14    SpO2 96

## 2025-03-17 NOTE — H&P
History and Physical - [facility]  Jose Alberto Penaloza 54 y.o. male MRN: [id number]                  HPI: Jose Alberto Penaloza is a 54 y.o. year old male who presents for history of IBD      REVIEW OF SYSTEMS: Per the HPI, and otherwise unremarkable.    Historical Information   Past Medical History:   Diagnosis Date    Allergic     Allergic rhinitis     Anxiety     Arthritis     Colon polyp     CPAP (continuous positive airway pressure) dependence     CURRENTLY NOT USING DUE TO RECALL    Depression     Seasonal allergies     Sleep apnea      Past Surgical History:   Procedure Laterality Date    APPENDECTOMY      COLONOSCOPY      EGD      HERNIA REPAIR       & 2016    ULNAR NERVE TRANSPOSITION Right 2023    WISDOM TOOTH EXTRACTION       Social History   Social History     Substance and Sexual Activity   Alcohol Use Not Currently     Social History     Substance and Sexual Activity   Drug Use Never     Social History     Tobacco Use   Smoking Status Former    Current packs/day: 0.00    Types: Cigars, Cigarettes    Start date:     Quit date: 10/1/2019    Years since quittin.4   Smokeless Tobacco Never   Tobacco Comments    on occasion     Family History   Problem Relation Age of Onset    Cancer Mother     Vision loss Mother     Anxiety disorder Mother     Depression Father     Diabetes Father     Cancer Sister     Anxiety disorder Sister        Meds/Allergies       Current Outpatient Medications:     escitalopram (LEXAPRO) 20 mg tablet    albuterol (Ventolin HFA) 90 mcg/act inhaler    buPROPion (WELLBUTRIN SR) 100 mg 12 hr tablet    CANNABIDIOL PO    polyethylene glycol (GOLYTELY) 4000 mL solution    Current Facility-Administered Medications:     lactated ringers infusion, 125 mL/hr, Intravenous, Continuous, 125 mL/hr at 25 0723    ondansetron (ZOFRAN) injection 4 mg, 4 mg, Intravenous, Once PRN    No Known Allergies    Objective     /81   Pulse 75   Temp 98.2 °F (36.8  "°C) (Temporal)   Resp 18   Ht 5' 10\" (1.778 m)   Wt 84.8 kg (186 lb 15.2 oz)   SpO2 97%   BMI 26.82 kg/m²       PHYSICAL EXAM    Gen: NAD  Head: NCAT  CV: RRR  CHEST: Clear  ABD: soft, NT/ND  EXT: no edema      ASSESSMENT/PLAN:  This is a 54 y.o. year old male here for colonoscopy, and he is stable and optimized for his procedure.        "

## 2025-03-20 PROCEDURE — 88305 TISSUE EXAM BY PATHOLOGIST: CPT | Performed by: PATHOLOGY

## 2025-03-21 ENCOUNTER — RESULTS FOLLOW-UP (OUTPATIENT)
Dept: GASTROENTEROLOGY | Facility: CLINIC | Age: 55
End: 2025-03-21

## 2025-04-23 ENCOUNTER — OFFICE VISIT (OUTPATIENT)
Dept: PULMONOLOGY | Facility: CLINIC | Age: 55
End: 2025-04-23
Payer: COMMERCIAL

## 2025-04-23 VITALS
HEIGHT: 70 IN | BODY MASS INDEX: 25.91 KG/M2 | TEMPERATURE: 97.6 F | OXYGEN SATURATION: 99 % | HEART RATE: 74 BPM | WEIGHT: 181 LBS | SYSTOLIC BLOOD PRESSURE: 110 MMHG | DIASTOLIC BLOOD PRESSURE: 80 MMHG

## 2025-04-23 DIAGNOSIS — J34.2 DEVIATED NASAL SEPTUM: ICD-10-CM

## 2025-04-23 DIAGNOSIS — G47.33 OBSTRUCTIVE SLEEP APNEA: Primary | ICD-10-CM

## 2025-04-23 PROCEDURE — 99204 OFFICE O/P NEW MOD 45 MIN: CPT | Performed by: INTERNAL MEDICINE

## 2025-04-23 NOTE — ASSESSMENT & PLAN NOTE
He was found to have deviated nasal septum with sinus issues and underwent sinus surgery and nasal septal surgery.  Currently he does not feel any obstruction.  On clinical examination he had mild deviation of nasal septum to the right.

## 2025-04-23 NOTE — PROGRESS NOTES
Name: Jose Alberto Penaloza      : 1970      MRN: [id number]  Encounter Provider: Chano Orozco MD  Encounter Date: 2025   Encounter department: [facility] [place]  :  Assessment & Plan  Obstructive sleep apnea  Mr. Jose Alberto Penaloza was previously diagnosed with obstructive sleep apnea about 6 years back with [facility] and was advised CPAP therapy which she could not tolerate.  He stated that he felt claustrophobic and could not tolerate the pressure or mask.  He was found to have deviated nasal septum and underwent surgery.  He recently had colonoscopy and was found to have heavy snoring witnessed apneas and waking up episodes during sleep gasping for air.  He also felt occasional morning headache.  He would like to be evaluated for sleep apnea again.  On clinical examination, he was not obese but had oropharyngeal crowding.  He needs to be further evaluated and I ordered an overnight home sleep study to see whether he has any significant sleep apnea at this time.  He denied any problem driving.  I had a long discussion with him and answered all his questions.  Orders:    Home Study; Future    Deviated nasal septum  He was found to have deviated nasal septum with sinus issues and underwent sinus surgery and nasal septal surgery.  Currently he does not feel any obstruction.  On clinical examination he had mild deviation of nasal septum to the right.             History of Present Illness   HPI  Jose Alberto Penaloza is a 54 y.o. male with past medical history of obstructive sleep apnea, deviated nasal septum and depression who has come for evaluation of his obstructive sleep apnea.  He stated that he was diagnosed with obstructive sleep apnea about 6 years back after an overnight home sleep study done with [facility] which is not currently available.  He was advised CPAP therapy based on that and he had problems with the mask and pressure.  He was also found  "to have deviated nasal septum and underwent sinus and nasal septal surgery.  He has not been using CPAP for a long time.  He recently had a colonoscopy and was found to have loud snoring witnessed apneas and he woke up frequently gasping for air.  He feels occasional morning headache.  He denied any daytime sleepiness or tiredness.  His [place] sleepiness score was 3 out of 24.  Denied any problem driving.  He denied any shortness of breath cough or phlegm or wheeze or chest pain.  He denied any swelling of feet.  He used marijuana for anxiety.      Review of Systems   Constitutional:  Positive for fatigue. Negative for appetite change, chills and fever.   HENT:  Positive for rhinorrhea. Negative for hearing loss, sneezing, sore throat and trouble swallowing.    Respiratory:  Positive for apnea. Negative for cough, chest tightness, shortness of breath and wheezing.    Cardiovascular:  Negative for chest pain, palpitations and leg swelling.   Gastrointestinal:  Negative for abdominal pain, constipation, diarrhea, nausea and vomiting.   Genitourinary:  Negative for dysuria, frequency and urgency.   Musculoskeletal:  Negative for arthralgias, back pain and gait problem.   Skin:  Negative for rash.   Allergic/Immunologic: Positive for environmental allergies.   Neurological:  Positive for headaches. Negative for dizziness, seizures, syncope and light-headedness.   Psychiatric/Behavioral:  Positive for sleep disturbance. Negative for agitation and confusion. The patient is not nervous/anxious.           Objective   /80 (BP Location: Left arm, Patient Position: Sitting, Cuff Size: Standard)   Pulse 74   Temp 97.6 °F (36.4 °C) (Tympanic)   Ht 5' 10\" (1.778 m)   Wt 82.1 kg (181 lb)   SpO2 99%   BMI 25.97 kg/m²      Physical Exam  Vitals reviewed.   Constitutional:       General: He is not in acute distress.     Appearance: He is not ill-appearing, toxic-appearing or diaphoretic.   HENT:      Head: Normocephalic. "      Nose:      Comments: Mild septal deviation to the right     Mouth/Throat:      Mouth: Mucous membranes are moist.      Comments: Oropharyngeal crowding Mallampati class III  Eyes:      General: No scleral icterus.     Conjunctiva/sclera: Conjunctivae normal.   Cardiovascular:      Rate and Rhythm: Normal rate and regular rhythm.      Heart sounds: Normal heart sounds. No murmur heard.  Pulmonary:      Effort: Pulmonary effort is normal. No respiratory distress.      Breath sounds: Normal breath sounds. No stridor. No wheezing, rhonchi or rales.   Chest:      Chest wall: No tenderness.   Abdominal:      General: Bowel sounds are normal.      Palpations: Abdomen is soft.      Tenderness: There is no abdominal tenderness. There is no guarding.   Musculoskeletal:      Cervical back: Neck supple. No rigidity.      Right lower leg: No edema.      Left lower leg: No edema.   Lymphadenopathy:      Cervical: No cervical adenopathy.   Skin:     Coloration: Skin is not jaundiced or pale.      Findings: No rash.   Neurological:      Mental Status: He is alert and oriented to person, place, and time.      Gait: Gait normal.   Psychiatric:         Mood and Affect: Mood normal.         Behavior: Behavior normal.         Thought Content: Thought content normal.         Judgment: Judgment normal.

## 2025-04-23 NOTE — ASSESSMENT & PLAN NOTE
MrFady Penaloza was previously diagnosed with obstructive sleep apnea about 6 years back with [facility] and was advised CPAP therapy which she could not tolerate.  He stated that he felt claustrophobic and could not tolerate the pressure or mask.  He was found to have deviated nasal septum and underwent surgery.  He recently had colonoscopy and was found to have heavy snoring witnessed apneas and waking up episodes during sleep gasping for air.  He also felt occasional morning headache.  He would like to be evaluated for sleep apnea again.  On clinical examination, he was not obese but had oropharyngeal crowding.  He needs to be further evaluated and I ordered an overnight home sleep study to see whether he has any significant sleep apnea at this time.  He denied any problem driving.  I had a long discussion with him and answered all his questions.  Orders:    Home Study; Future

## 2025-06-24 ENCOUNTER — HOSPITAL ENCOUNTER (OUTPATIENT)
Dept: SLEEP CENTER | Facility: CLINIC | Age: 55
Discharge: HOME | End: 2025-06-24
Attending: INTERNAL MEDICINE
Payer: COMMERCIAL

## 2025-06-24 DIAGNOSIS — G47.33 OBSTRUCTIVE SLEEP APNEA: ICD-10-CM

## 2025-06-24 PROCEDURE — G0399 HOME SLEEP TEST/TYPE 3 PORTA: HCPCS

## 2025-06-24 PROCEDURE — 95806 SLEEP STUDY UNATT&RESP EFFT: CPT | Performed by: INTERNAL MEDICINE

## 2025-06-24 NOTE — TELEPHONE ENCOUNTER
LMOM to call back and reschedule w/ Dr Rain 6/24.  Provider 000. Patient can be double booked on Monday 6/30 @ 11:30-Brad[place] per Robert MA

## 2025-06-24 NOTE — PROGRESS NOTES
Home Sleep Study Documentation    HOME STUDY DEVICE: Noxturnal no                                           June G3 yes device # 17      Pre-Sleep Home Study:    Set-up and instructions performed by: Preethi    Technician performed demonstration for Patient: yes    Return demonstration performed by Patient: yes    Written instructions provided to Patient: yes    Patient signed consent form: yes          Post-Sleep Home Study:    Post Test Questionnaire Data: On the post-study questionnaire, the patient estimated 6.5 hours of sleep during this test, with 4 awakenings. Please refer to scanned form for additional comments on alcohol use on day of test, medications, and/or activities during awakenings    Additional comments by Patient: None    Home Sleep Study Failed:no:    Failure reason: N/A    Reported or Detected: N/A    Scored by: KELBY Zhang

## 2025-06-26 ENCOUNTER — RESULTS FOLLOW-UP (OUTPATIENT)
Dept: PULMONOLOGY | Facility: CLINIC | Age: 55
End: 2025-06-26

## 2025-06-26 DIAGNOSIS — G47.33 OBSTRUCTIVE SLEEP APNEA: Primary | ICD-10-CM

## 2025-06-26 NOTE — TELEPHONE ENCOUNTER
Call from the patient, stating that he is extremely upset with the phone call that he received earlier today from Dr. Orozco. He stated that the phone number was unrecognizable and he was left a 15 minute voice message and this was not at all what he had discussed with the physician at the office visit.    He is demanding a call back from the physician on a recognizable phone number to discuss this message and is also demanding a phone call back from pulmonary leadership today. He also states he will be speaking with Radha Hodgson about this as he feels what transpired today was totally unacceptable.

## 2025-06-26 NOTE — RESULT ENCOUNTER NOTE
Spoke with pt states he did not see the message in mychart stating the cpap machine ordered was auto and that all is good. Reassured him that the ordered in his chart in our system is auto as well.

## 2025-06-27 LAB

## 2025-06-30 ENCOUNTER — OFFICE VISIT (OUTPATIENT)
Dept: OBGYN CLINIC | Facility: CLINIC | Age: 55
End: 2025-06-30
Payer: COMMERCIAL

## 2025-06-30 VITALS — HEIGHT: 70 IN | BODY MASS INDEX: 25.91 KG/M2 | WEIGHT: 181 LBS

## 2025-06-30 DIAGNOSIS — G89.29 CHRONIC LEFT-SIDED LOW BACK PAIN WITHOUT SCIATICA: Primary | ICD-10-CM

## 2025-06-30 DIAGNOSIS — M54.50 CHRONIC LEFT-SIDED LOW BACK PAIN WITHOUT SCIATICA: Primary | ICD-10-CM

## 2025-06-30 PROCEDURE — 99214 OFFICE O/P EST MOD 30 MIN: CPT | Performed by: PHYSICAL MEDICINE & REHABILITATION

## 2025-06-30 NOTE — PATIENT INSTRUCTIONS
Room temperature/warm soaks with epsom salt can help with muscle tightness/cramping.  Epsom salt releases magnesium which can be helpful.    Over-the-counter salonpas patches can be applied to the area of discomfort to help with pain.  There are two types of patches; one contains lidocaine 4% and the other contains menthol (and sometimes camphor and methyl salicylate).  You can try one or the other and see which one works best for you.     You can obtain diclofenac cream/gel/ointment over the counter.      Many brands make this medication and they include Voltaren, Aspercreme and Aleve.    You can use this up to 3 times per day.  It is best to wash the area with water, pat dry and then apply.  The cream absorbs better after this.    Be careful not to let any pets or children get in contact with the medication as it can be harmful to them.    If you develop a skin reaction, stop using the cream.     Rules for limiting activity by pain symptoms:      -You can work through some pain, but keep it at a level from which you are distractible. Pain should not exceed 3/10 in severity.   -Do not change your biomechanics / form with your activity.  This can lead to further injury.   -If you pay for your activity later with substantial symptom exacerbation, you are not yet ready for that movement or level of exertion.

## 2025-06-30 NOTE — ASSESSMENT & PLAN NOTE
Left lumbar pain that is multifactorial and predominantly due to left sacroiliac joint dysfunction, radiculopathy, facet hypertrophy and L5-S1 disc space narrowing.  There are no concerning neurological deficits.    We discussed different treatment options and decided to proceed with topical medications and gradual return to physical activity as tolerated.  He will continue with his prescribed home exercise program.  I will see him back in 4-5 weeks if needed.

## 2025-06-30 NOTE — PROGRESS NOTES
"Assessment & Plan  Chronic left-sided low back pain without sciatica  Left lumbar pain that is multifactorial and predominantly due to left sacroiliac joint dysfunction, radiculopathy, facet hypertrophy and L5-S1 disc space narrowing.  There are no concerning neurological deficits.    We discussed different treatment options and decided to proceed with topical medications and gradual return to physical activity as tolerated.  He will continue with his prescribed home exercise program.  I will see him back in 4-5 weeks if needed.        Imaging Studies (I personally reviewed images in PACS and report if it was available):  Lumbar spine x-rays that are most recent to this encounter were reviewed.  These images show L5-S1 disc space narrowing with grade 1 spondylolisthesis.    HPI:  Jose Alberto Penaloza is a 54 y.o. male  who presents for evaluation of   Chief Complaint   Patient presents with    Lower Back - Pain       Onset/Mechanism: Chronic pain since October.  Location: Left low back.  Radiation: Denies.  Quality: Pain.  Provocative: As above.  Severity: Currently a 1 out of 10 but can be a 10 out of 10.  Associated Symptoms: Denies.  Treatment so far: Physical therapy, chiropractic care and anti-inflammatory medications.    Pain has improved.  He did have pain going down the knees.  He had a massage which helped.  He did take Advil which helped.      No red flag symptoms including fever/chills, unintentional weight change, bowel/bladder incontinence, scissoring gait, personal/family history of cancer, pain worse at night, intravenous drug abuse or trauma.      Following history reviewed and updated:  Past Medical History[1]  Past Surgical History[2]  Social History   Social History     Substance and Sexual Activity   Alcohol Use Not Currently     Social History     Substance and Sexual Activity   Drug Use Never     Tobacco Use History[3]  Family History[4]  Allergies[5]     Constitutional:  Ht 5' 10\" (1.778 m)   Wt " 82.1 kg (181 lb)   BMI 25.97 kg/m²    General: NAD.   Eyes: Anicteric sclerae.  Neck: Supple.  Lungs: Unlabored breathing.  Cardiovascular: No lower extremity edema.  Skin: Intact without erythema.  Neurologic: Sensation intact to light touch.  Psychiatric: Mood and affect are appropriate.    Orthopedic Examination  Inspection: No obvious deformities, lesions or rashes.  ROM: Limited mostly with extension.  Palpation: Kade to palpation along the left superior portion of the SI joint and lumbar paraspinals.. There are no step offs.  Neuro: Bilateral extremity strength is normal and symmetric. No muscle atrophy or abnormal tone.  Bilateral extremity muscle stretch reflexes are physiologic and symmetric .  No myelopathic signs.   Sensation to light touch is intact throughout.  Neural compression testing: Normal bilateral SLR/dural stretch.  Positive bilateral Craig's maneuver.    Gait is normal.    Procedures                     [1]   Past Medical History:  Diagnosis Date    Allergic     Allergic rhinitis     Anxiety     Arthritis     Colon polyp     CPAP (continuous positive airway pressure) dependence     CURRENTLY NOT USING DUE TO RECALL    Depression     Seasonal allergies     Sleep apnea    [2]   Past Surgical History:  Procedure Laterality Date    APPENDECTOMY      COLONOSCOPY      EGD      HERNIA REPAIR       & 2016    ULNAR NERVE TRANSPOSITION Right 2023    WISDOM TOOTH EXTRACTION     [3]   Social History  Tobacco Use   Smoking Status Former    Current packs/day: 0.00    Types: Cigars, Cigarettes    Start date:     Quit date: 10/1/2019    Years since quittin.7   Smokeless Tobacco Never   Tobacco Comments    on occasion   [4]   Family History  Problem Relation Name Age of Onset    Cancer Mother Nichole     Vision loss Mother Nichole     Anxiety disorder Mother Nichole     Depression Father Zen     Diabetes Father Zen     Cancer Sister Giulia     Anxiety disorder Sister Giulia    [5] No Known  Allergies

## 2025-07-31 ENCOUNTER — DOCUMENTATION (OUTPATIENT)
Dept: PULMONOLOGY | Facility: CLINIC | Age: 55
End: 2025-07-31

## 2025-08-04 ENCOUNTER — TELEPHONE (OUTPATIENT)
Age: 55
End: 2025-08-04